# Patient Record
Sex: FEMALE | Race: WHITE | URBAN - METROPOLITAN AREA
[De-identification: names, ages, dates, MRNs, and addresses within clinical notes are randomized per-mention and may not be internally consistent; named-entity substitution may affect disease eponyms.]

---

## 2018-02-26 ENCOUNTER — OUTPATIENT (OUTPATIENT)
Dept: OUTPATIENT SERVICES | Facility: HOSPITAL | Age: 83
LOS: 1 days | Discharge: HOME | End: 2018-02-26

## 2018-02-26 DIAGNOSIS — Z13.228 ENCOUNTER FOR SCREENING FOR OTHER METABOLIC DISORDERS: ICD-10-CM

## 2018-02-26 DIAGNOSIS — I10 ESSENTIAL (PRIMARY) HYPERTENSION: ICD-10-CM

## 2018-02-26 DIAGNOSIS — Z13.21 ENCOUNTER FOR SCREENING FOR NUTRITIONAL DISORDER: ICD-10-CM

## 2018-02-26 DIAGNOSIS — N39.0 URINARY TRACT INFECTION, SITE NOT SPECIFIED: ICD-10-CM

## 2018-02-26 DIAGNOSIS — E55.9 VITAMIN D DEFICIENCY, UNSPECIFIED: ICD-10-CM

## 2018-02-26 DIAGNOSIS — Z13.9 ENCOUNTER FOR SCREENING, UNSPECIFIED: ICD-10-CM

## 2018-02-26 DIAGNOSIS — D75.9 DISEASE OF BLOOD AND BLOOD-FORMING ORGANS, UNSPECIFIED: ICD-10-CM

## 2018-02-26 DIAGNOSIS — Z13.220 ENCOUNTER FOR SCREENING FOR LIPOID DISORDERS: ICD-10-CM

## 2018-02-26 DIAGNOSIS — E78.5 HYPERLIPIDEMIA, UNSPECIFIED: ICD-10-CM

## 2018-02-28 ENCOUNTER — TRANSCRIPTION ENCOUNTER (OUTPATIENT)
Age: 83
End: 2018-02-28

## 2018-10-14 ENCOUNTER — EMERGENCY (EMERGENCY)
Facility: HOSPITAL | Age: 83
LOS: 0 days | Discharge: HOME | End: 2018-10-14
Attending: EMERGENCY MEDICINE

## 2018-10-14 ENCOUNTER — TRANSCRIPTION ENCOUNTER (OUTPATIENT)
Age: 83
End: 2018-10-14

## 2018-10-14 VITALS
DIASTOLIC BLOOD PRESSURE: 89 MMHG | SYSTOLIC BLOOD PRESSURE: 210 MMHG | TEMPERATURE: 98 F | OXYGEN SATURATION: 100 % | HEART RATE: 91 BPM | RESPIRATION RATE: 18 BRPM

## 2018-10-14 DIAGNOSIS — R07.89 OTHER CHEST PAIN: ICD-10-CM

## 2018-10-14 DIAGNOSIS — R06.02 SHORTNESS OF BREATH: ICD-10-CM

## 2018-10-14 DIAGNOSIS — J44.9 CHRONIC OBSTRUCTIVE PULMONARY DISEASE, UNSPECIFIED: ICD-10-CM

## 2018-10-14 LAB
ALBUMIN SERPL ELPH-MCNC: 4.5 G/DL — SIGNIFICANT CHANGE UP (ref 3.5–5.2)
ALP SERPL-CCNC: 71 U/L — SIGNIFICANT CHANGE UP (ref 30–115)
ALT FLD-CCNC: 18 U/L — SIGNIFICANT CHANGE UP (ref 0–41)
ANION GAP SERPL CALC-SCNC: 12 MMOL/L — SIGNIFICANT CHANGE UP (ref 7–14)
APPEARANCE UR: CLEAR — SIGNIFICANT CHANGE UP
APTT BLD: 29.4 SEC — SIGNIFICANT CHANGE UP (ref 27–39.2)
AST SERPL-CCNC: 21 U/L — SIGNIFICANT CHANGE UP (ref 0–41)
BASOPHILS # BLD AUTO: 0.05 K/UL — SIGNIFICANT CHANGE UP (ref 0–0.2)
BASOPHILS NFR BLD AUTO: 0.8 % — SIGNIFICANT CHANGE UP (ref 0–1)
BILIRUB SERPL-MCNC: 0.3 MG/DL — SIGNIFICANT CHANGE UP (ref 0.2–1.2)
BILIRUB UR-MCNC: NEGATIVE — SIGNIFICANT CHANGE UP
BUN SERPL-MCNC: 10 MG/DL — SIGNIFICANT CHANGE UP (ref 10–20)
CALCIUM SERPL-MCNC: 10.1 MG/DL — SIGNIFICANT CHANGE UP (ref 8.5–10.1)
CHLORIDE SERPL-SCNC: 96 MMOL/L — LOW (ref 98–110)
CO2 SERPL-SCNC: 28 MMOL/L — SIGNIFICANT CHANGE UP (ref 17–32)
COLOR SPEC: YELLOW — SIGNIFICANT CHANGE UP
CREAT SERPL-MCNC: 0.7 MG/DL — SIGNIFICANT CHANGE UP (ref 0.7–1.5)
D DIMER BLD IA.RAPID-MCNC: 202 NG/ML DDU — SIGNIFICANT CHANGE UP (ref 0–230)
DIFF PNL FLD: NEGATIVE — SIGNIFICANT CHANGE UP
EOSINOPHIL # BLD AUTO: 0.13 K/UL — SIGNIFICANT CHANGE UP (ref 0–0.7)
EOSINOPHIL NFR BLD AUTO: 2.2 % — SIGNIFICANT CHANGE UP (ref 0–8)
GLUCOSE SERPL-MCNC: 150 MG/DL — HIGH (ref 70–99)
GLUCOSE UR QL: NEGATIVE MG/DL — SIGNIFICANT CHANGE UP
HCT VFR BLD CALC: 39.6 % — SIGNIFICANT CHANGE UP (ref 37–47)
HGB BLD-MCNC: 13.8 G/DL — SIGNIFICANT CHANGE UP (ref 12–16)
IMM GRANULOCYTES NFR BLD AUTO: 0.3 % — SIGNIFICANT CHANGE UP (ref 0.1–0.3)
INR BLD: 1.02 RATIO — SIGNIFICANT CHANGE UP (ref 0.65–1.3)
KETONES UR-MCNC: NEGATIVE — SIGNIFICANT CHANGE UP
LEUKOCYTE ESTERASE UR-ACNC: NEGATIVE — SIGNIFICANT CHANGE UP
LYMPHOCYTES # BLD AUTO: 1.52 K/UL — SIGNIFICANT CHANGE UP (ref 1.2–3.4)
LYMPHOCYTES # BLD AUTO: 25.8 % — SIGNIFICANT CHANGE UP (ref 20.5–51.1)
MCHC RBC-ENTMCNC: 31.2 PG — HIGH (ref 27–31)
MCHC RBC-ENTMCNC: 34.8 G/DL — SIGNIFICANT CHANGE UP (ref 32–37)
MCV RBC AUTO: 89.6 FL — SIGNIFICANT CHANGE UP (ref 81–99)
MONOCYTES # BLD AUTO: 0.45 K/UL — SIGNIFICANT CHANGE UP (ref 0.1–0.6)
MONOCYTES NFR BLD AUTO: 7.6 % — SIGNIFICANT CHANGE UP (ref 1.7–9.3)
NEUTROPHILS # BLD AUTO: 3.73 K/UL — SIGNIFICANT CHANGE UP (ref 1.4–6.5)
NEUTROPHILS NFR BLD AUTO: 63.3 % — SIGNIFICANT CHANGE UP (ref 42.2–75.2)
NITRITE UR-MCNC: NEGATIVE — SIGNIFICANT CHANGE UP
NRBC # BLD: 0 /100 WBCS — SIGNIFICANT CHANGE UP (ref 0–0)
NT-PROBNP SERPL-SCNC: 153 PG/ML — SIGNIFICANT CHANGE UP (ref 0–300)
PH UR: 6.5 — SIGNIFICANT CHANGE UP (ref 5–8)
PLATELET # BLD AUTO: 344 K/UL — SIGNIFICANT CHANGE UP (ref 130–400)
POTASSIUM SERPL-MCNC: 4.2 MMOL/L — SIGNIFICANT CHANGE UP (ref 3.5–5)
POTASSIUM SERPL-SCNC: 4.2 MMOL/L — SIGNIFICANT CHANGE UP (ref 3.5–5)
PROT SERPL-MCNC: 7.3 G/DL — SIGNIFICANT CHANGE UP (ref 6–8)
PROT UR-MCNC: NEGATIVE MG/DL — SIGNIFICANT CHANGE UP
PROTHROM AB SERPL-ACNC: 11.7 SEC — SIGNIFICANT CHANGE UP (ref 9.95–12.87)
RBC # BLD: 4.42 M/UL — SIGNIFICANT CHANGE UP (ref 4.2–5.4)
RBC # FLD: 13.1 % — SIGNIFICANT CHANGE UP (ref 11.5–14.5)
SODIUM SERPL-SCNC: 136 MMOL/L — SIGNIFICANT CHANGE UP (ref 135–146)
SP GR SPEC: 1.01 — SIGNIFICANT CHANGE UP (ref 1.01–1.03)
TROPONIN T SERPL-MCNC: <0.01 NG/ML — SIGNIFICANT CHANGE UP
UROBILINOGEN FLD QL: 0.2 MG/DL — SIGNIFICANT CHANGE UP (ref 0.2–0.2)
WBC # BLD: 5.9 K/UL — SIGNIFICANT CHANGE UP (ref 4.8–10.8)
WBC # FLD AUTO: 5.9 K/UL — SIGNIFICANT CHANGE UP (ref 4.8–10.8)

## 2018-10-14 NOTE — ED PROVIDER NOTE - PROGRESS NOTE DETAILS
all results d/w pt, advised of recommendation to stay in Obs for ACS work-up - pt does not want to stay, wishes to leave AMA and f/u with her PMD Dr. Way - risks of same explained to pt incl poss disability/death - pt verbalized understanding of same, encouraged to return to ED @ any time for further mgmt

## 2018-10-14 NOTE — ED PROVIDER NOTE - PHYSICAL EXAMINATION
VITAL SIGNS: I have reviewed nursing notes and confirm.  CONSTITUTIONAL: elderly f, wdwn, nad  SKIN: Skin exam is warm and dry, no acute rash.  HEAD: Normocephalic; atraumatic.  EYES: PERRL, EOM intact; conjunctiva and sclera clear.  ENT: No nasal discharge; airway clear.  NECK: Supple; non tender.  CARD: S1, S2 normal; no murmurs, gallops, or rubs. Regular rate and rhythm.  RESP: CTAB. No wheezes, rales or rhonchi.  ABD: Normal bowel sounds; soft; non-distended; non-tender; no hepatosplenomegaly.  EXT: Normal ROM. No clubbing, cyanosis or edema. DPI. No calf erythema or ttp.  BACK: non-tender, no CVAT  NEURO: Alert, oriented. Grossly unremarkable. No focal deficits.  PSYCH: Cooperative, appropriate.

## 2018-10-14 NOTE — ED PROVIDER NOTE - OBJECTIVE STATEMENT
83y f h/o copd no home O2 p/w incr sob x 5d. Worse w/exertion/exercising, accomp by chest tightness yesterday while doing strenuous yard work. Denies dizziness, palpitations, diaphoresis, nv, abd pain, flank pain, calf pain/erythema/edema. +Recent plane travel, flew back from hospitals appx 1 week ago. Rpts a negative stress test in past, but has never seen a Cardiologist. PMD Dr. Rayna Rodríguez.

## 2018-10-14 NOTE — ED ADULT NURSE NOTE - NSIMPLEMENTINTERV_GEN_ALL_ED
Implemented All Universal Safety Interventions:  [place] to call system. Call bell, personal items and telephone within reach. Instruct patient to call for assistance. Room bathroom lighting operational. Non-slip footwear when patient is off stretcher. Physically safe environment: no spills, clutter or unnecessary equipment. Stretcher in lowest position, wheels locked, appropriate side rails in place.

## 2018-10-14 NOTE — ED PROVIDER NOTE - NS ED ROS FT
Constitutional: No fever, chills, unintended weight loss.  Eyes:  No visual changes, eye pain or discharge.  ENMT:  No hearing changes, pain, no sore throat or runny nose, no difficulty swallowing  Cardiac:  see hpi  Respiratory:  No cough or respiratory distress. No hemoptysis. +copd.  GI:  No nausea, vomiting, diarrhea or abdominal pain.  :  No dysuria, frequency or burning.  MS:  No myalgia, muscle weakness, joint pain or back pain.  Neuro:  No headache or weakness.  No LOC.  Skin:  No skin rash.   Endocrine: No history of thyroid disease or diabetes.

## 2018-10-14 NOTE — ED PROVIDER NOTE - MEDICAL DECISION MAKING DETAILS
sob, chest tightness, recent travel - no wheezing on exam - ddx incl ACS vs. PE - ekg, cxr, labs incl d-dimer, reassess

## 2018-10-15 LAB
CULTURE RESULTS: SIGNIFICANT CHANGE UP
SPECIMEN SOURCE: SIGNIFICANT CHANGE UP

## 2019-03-14 ENCOUNTER — OUTPATIENT (OUTPATIENT)
Dept: OUTPATIENT SERVICES | Facility: HOSPITAL | Age: 84
LOS: 1 days | Discharge: HOME | End: 2019-03-14

## 2019-03-14 DIAGNOSIS — D53.9 NUTRITIONAL ANEMIA, UNSPECIFIED: ICD-10-CM

## 2019-03-14 PROBLEM — J44.9 CHRONIC OBSTRUCTIVE PULMONARY DISEASE, UNSPECIFIED: Chronic | Status: ACTIVE | Noted: 2018-10-14

## 2019-03-18 ENCOUNTER — OUTPATIENT (OUTPATIENT)
Dept: OUTPATIENT SERVICES | Facility: HOSPITAL | Age: 84
LOS: 1 days | Discharge: HOME | End: 2019-03-18

## 2019-03-18 DIAGNOSIS — Z00.00 ENCOUNTER FOR GENERAL ADULT MEDICAL EXAMINATION WITHOUT ABNORMAL FINDINGS: ICD-10-CM

## 2019-12-16 ENCOUNTER — TRANSCRIPTION ENCOUNTER (OUTPATIENT)
Age: 84
End: 2019-12-16

## 2020-01-04 ENCOUNTER — TRANSCRIPTION ENCOUNTER (OUTPATIENT)
Age: 85
End: 2020-01-04

## 2020-01-21 ENCOUNTER — INPATIENT (INPATIENT)
Facility: HOSPITAL | Age: 85
LOS: 1 days | Discharge: HOME | End: 2020-01-23
Attending: INTERNAL MEDICINE | Admitting: INTERNAL MEDICINE
Payer: MEDICARE

## 2020-01-21 VITALS
TEMPERATURE: 96 F | DIASTOLIC BLOOD PRESSURE: 65 MMHG | OXYGEN SATURATION: 90 % | HEART RATE: 96 BPM | SYSTOLIC BLOOD PRESSURE: 149 MMHG | RESPIRATION RATE: 22 BRPM

## 2020-01-21 LAB
ALBUMIN SERPL ELPH-MCNC: 4.3 G/DL — SIGNIFICANT CHANGE UP (ref 3.5–5.2)
ALP SERPL-CCNC: 72 U/L — SIGNIFICANT CHANGE UP (ref 30–115)
ALT FLD-CCNC: 18 U/L — SIGNIFICANT CHANGE UP (ref 0–41)
ANION GAP SERPL CALC-SCNC: 13 MMOL/L — SIGNIFICANT CHANGE UP (ref 7–14)
AST SERPL-CCNC: 24 U/L — SIGNIFICANT CHANGE UP (ref 0–41)
BASE EXCESS BLDV CALC-SCNC: 3.2 MMOL/L — HIGH (ref -2–2)
BASOPHILS # BLD AUTO: 0.04 K/UL — SIGNIFICANT CHANGE UP (ref 0–0.2)
BASOPHILS NFR BLD AUTO: 0.4 % — SIGNIFICANT CHANGE UP (ref 0–1)
BILIRUB SERPL-MCNC: 0.3 MG/DL — SIGNIFICANT CHANGE UP (ref 0.2–1.2)
BUN SERPL-MCNC: 11 MG/DL — SIGNIFICANT CHANGE UP (ref 10–20)
CA-I SERPL-SCNC: 1.16 MMOL/L — SIGNIFICANT CHANGE UP (ref 1.12–1.3)
CALCIUM SERPL-MCNC: 9.3 MG/DL — SIGNIFICANT CHANGE UP (ref 8.5–10.1)
CHLORIDE SERPL-SCNC: 99 MMOL/L — SIGNIFICANT CHANGE UP (ref 98–110)
CO2 SERPL-SCNC: 24 MMOL/L — SIGNIFICANT CHANGE UP (ref 17–32)
CREAT SERPL-MCNC: 0.7 MG/DL — SIGNIFICANT CHANGE UP (ref 0.7–1.5)
D DIMER BLD IA.RAPID-MCNC: 277 NG/ML DDU — HIGH (ref 0–230)
EOSINOPHIL # BLD AUTO: 0 K/UL — SIGNIFICANT CHANGE UP (ref 0–0.7)
EOSINOPHIL NFR BLD AUTO: 0 % — SIGNIFICANT CHANGE UP (ref 0–8)
FLU A RESULT: POSITIVE
FLU A RESULT: POSITIVE
FLUAV AG NPH QL: POSITIVE
FLUBV AG NPH QL: NEGATIVE — SIGNIFICANT CHANGE UP
GAS PNL BLDV: 134 MMOL/L — LOW (ref 136–145)
GAS PNL BLDV: SIGNIFICANT CHANGE UP
GAS PNL BLDV: SIGNIFICANT CHANGE UP
GLUCOSE SERPL-MCNC: 165 MG/DL — HIGH (ref 70–99)
HCO3 BLDV-SCNC: 28 MMOL/L — SIGNIFICANT CHANGE UP (ref 22–29)
HCT VFR BLD CALC: 37.1 % — SIGNIFICANT CHANGE UP (ref 37–47)
HCT VFR BLDA CALC: 41.4 % — SIGNIFICANT CHANGE UP (ref 34–44)
HGB BLD CALC-MCNC: 13.5 G/DL — LOW (ref 14–18)
HGB BLD-MCNC: 13 G/DL — SIGNIFICANT CHANGE UP (ref 12–16)
IMM GRANULOCYTES NFR BLD AUTO: 0.4 % — HIGH (ref 0.1–0.3)
LACTATE BLDV-MCNC: 1 MMOL/L — SIGNIFICANT CHANGE UP (ref 0.5–1.6)
LACTATE SERPL-SCNC: 1.1 MMOL/L — SIGNIFICANT CHANGE UP (ref 0.7–2)
LIDOCAIN IGE QN: 16 U/L — SIGNIFICANT CHANGE UP (ref 7–60)
LYMPHOCYTES # BLD AUTO: 0.44 K/UL — LOW (ref 1.2–3.4)
LYMPHOCYTES # BLD AUTO: 4.6 % — LOW (ref 20.5–51.1)
MCHC RBC-ENTMCNC: 32.3 PG — HIGH (ref 27–31)
MCHC RBC-ENTMCNC: 35 G/DL — SIGNIFICANT CHANGE UP (ref 32–37)
MCV RBC AUTO: 92.1 FL — SIGNIFICANT CHANGE UP (ref 81–99)
MONOCYTES # BLD AUTO: 0.5 K/UL — SIGNIFICANT CHANGE UP (ref 0.1–0.6)
MONOCYTES NFR BLD AUTO: 5.2 % — SIGNIFICANT CHANGE UP (ref 1.7–9.3)
NEUTROPHILS # BLD AUTO: 8.53 K/UL — HIGH (ref 1.4–6.5)
NEUTROPHILS NFR BLD AUTO: 89.4 % — HIGH (ref 42.2–75.2)
NRBC # BLD: 0 /100 WBCS — SIGNIFICANT CHANGE UP (ref 0–0)
PCO2 BLDV: 41 MMHG — SIGNIFICANT CHANGE UP (ref 41–51)
PH BLDV: 7.44 — HIGH (ref 7.26–7.43)
PLATELET # BLD AUTO: 295 K/UL — SIGNIFICANT CHANGE UP (ref 130–400)
PO2 BLDV: 37 MMHG — SIGNIFICANT CHANGE UP (ref 20–40)
POTASSIUM BLDV-SCNC: 3.6 MMOL/L — SIGNIFICANT CHANGE UP (ref 3.3–5.6)
POTASSIUM SERPL-MCNC: 4 MMOL/L — SIGNIFICANT CHANGE UP (ref 3.5–5)
POTASSIUM SERPL-SCNC: 4 MMOL/L — SIGNIFICANT CHANGE UP (ref 3.5–5)
PROT SERPL-MCNC: 6.9 G/DL — SIGNIFICANT CHANGE UP (ref 6–8)
RBC # BLD: 4.03 M/UL — LOW (ref 4.2–5.4)
RBC # FLD: 13.6 % — SIGNIFICANT CHANGE UP (ref 11.5–14.5)
RSV RESULT: NEGATIVE — SIGNIFICANT CHANGE UP
RSV RNA RESP QL NAA+PROBE: NEGATIVE — SIGNIFICANT CHANGE UP
SAO2 % BLDV: 71 % — SIGNIFICANT CHANGE UP
SODIUM SERPL-SCNC: 136 MMOL/L — SIGNIFICANT CHANGE UP (ref 135–146)
TROPONIN T SERPL-MCNC: <0.01 NG/ML — SIGNIFICANT CHANGE UP
WBC # BLD: 9.55 K/UL — SIGNIFICANT CHANGE UP (ref 4.8–10.8)
WBC # FLD AUTO: 9.55 K/UL — SIGNIFICANT CHANGE UP (ref 4.8–10.8)

## 2020-01-21 PROCEDURE — 99285 EMERGENCY DEPT VISIT HI MDM: CPT | Mod: GC

## 2020-01-21 PROCEDURE — 71045 X-RAY EXAM CHEST 1 VIEW: CPT | Mod: 26

## 2020-01-21 PROCEDURE — 93010 ELECTROCARDIOGRAM REPORT: CPT

## 2020-01-21 PROCEDURE — 71275 CT ANGIOGRAPHY CHEST: CPT | Mod: 26

## 2020-01-21 RX ORDER — ACETAMINOPHEN 500 MG
650 TABLET ORAL ONCE
Refills: 0 | Status: COMPLETED | OUTPATIENT
Start: 2020-01-21 | End: 2020-01-21

## 2020-01-21 RX ORDER — IPRATROPIUM/ALBUTEROL SULFATE 18-103MCG
3 AEROSOL WITH ADAPTER (GRAM) INHALATION EVERY 6 HOURS
Refills: 0 | Status: DISCONTINUED | OUTPATIENT
Start: 2020-01-21 | End: 2020-01-23

## 2020-01-21 RX ORDER — SODIUM CHLORIDE 9 MG/ML
1000 INJECTION INTRAMUSCULAR; INTRAVENOUS; SUBCUTANEOUS ONCE
Refills: 0 | Status: COMPLETED | OUTPATIENT
Start: 2020-01-21 | End: 2020-01-21

## 2020-01-21 RX ORDER — CHLORHEXIDINE GLUCONATE 213 G/1000ML
1 SOLUTION TOPICAL
Refills: 0 | Status: DISCONTINUED | OUTPATIENT
Start: 2020-01-21 | End: 2020-01-23

## 2020-01-21 RX ORDER — IPRATROPIUM/ALBUTEROL SULFATE 18-103MCG
3 AEROSOL WITH ADAPTER (GRAM) INHALATION EVERY 6 HOURS
Refills: 0 | Status: DISCONTINUED | OUTPATIENT
Start: 2020-01-21 | End: 2020-01-21

## 2020-01-21 RX ORDER — GUAIFENESIN/DEXTROMETHORPHAN 600MG-30MG
10 TABLET, EXTENDED RELEASE 12 HR ORAL EVERY 4 HOURS
Refills: 0 | Status: DISCONTINUED | OUTPATIENT
Start: 2020-01-21 | End: 2020-01-23

## 2020-01-21 RX ORDER — ONDANSETRON 8 MG/1
4 TABLET, FILM COATED ORAL EVERY 6 HOURS
Refills: 0 | Status: DISCONTINUED | OUTPATIENT
Start: 2020-01-21 | End: 2020-01-23

## 2020-01-21 RX ORDER — BUDESONIDE AND FORMOTEROL FUMARATE DIHYDRATE 160; 4.5 UG/1; UG/1
2 AEROSOL RESPIRATORY (INHALATION)
Refills: 0 | Status: DISCONTINUED | OUTPATIENT
Start: 2020-01-21 | End: 2020-01-23

## 2020-01-21 RX ORDER — ENOXAPARIN SODIUM 100 MG/ML
40 INJECTION SUBCUTANEOUS DAILY
Refills: 0 | Status: DISCONTINUED | OUTPATIENT
Start: 2020-01-21 | End: 2020-01-23

## 2020-01-21 RX ORDER — SODIUM CHLORIDE 9 MG/ML
1000 INJECTION INTRAMUSCULAR; INTRAVENOUS; SUBCUTANEOUS
Refills: 0 | Status: DISCONTINUED | OUTPATIENT
Start: 2020-01-21 | End: 2020-01-22

## 2020-01-21 RX ORDER — ONDANSETRON 8 MG/1
4 TABLET, FILM COATED ORAL ONCE
Refills: 0 | Status: COMPLETED | OUTPATIENT
Start: 2020-01-21 | End: 2020-01-21

## 2020-01-21 RX ORDER — ACETAMINOPHEN 500 MG
650 TABLET ORAL EVERY 6 HOURS
Refills: 0 | Status: DISCONTINUED | OUTPATIENT
Start: 2020-01-21 | End: 2020-01-23

## 2020-01-21 RX ORDER — ALBUTEROL 90 UG/1
2 AEROSOL, METERED ORAL EVERY 4 HOURS
Refills: 0 | Status: DISCONTINUED | OUTPATIENT
Start: 2020-01-21 | End: 2020-01-23

## 2020-01-21 RX ADMIN — CHLORHEXIDINE GLUCONATE 1 APPLICATION(S): 213 SOLUTION TOPICAL at 17:05

## 2020-01-21 RX ADMIN — ONDANSETRON 4 MILLIGRAM(S): 8 TABLET, FILM COATED ORAL at 08:03

## 2020-01-21 RX ADMIN — Medication 650 MILLIGRAM(S): at 09:28

## 2020-01-21 RX ADMIN — Medication 3 MILLILITER(S): at 15:29

## 2020-01-21 RX ADMIN — Medication 650 MILLIGRAM(S): at 19:51

## 2020-01-21 RX ADMIN — Medication 10 MILLILITER(S): at 22:47

## 2020-01-21 RX ADMIN — Medication 75 MILLIGRAM(S): at 14:37

## 2020-01-21 RX ADMIN — Medication 10 MILLILITER(S): at 17:28

## 2020-01-21 RX ADMIN — Medication 3 MILLILITER(S): at 21:49

## 2020-01-21 RX ADMIN — SODIUM CHLORIDE 2000 MILLILITER(S): 9 INJECTION INTRAMUSCULAR; INTRAVENOUS; SUBCUTANEOUS at 08:03

## 2020-01-21 RX ADMIN — SODIUM CHLORIDE 50 MILLILITER(S): 9 INJECTION INTRAMUSCULAR; INTRAVENOUS; SUBCUTANEOUS at 15:29

## 2020-01-21 RX ADMIN — ENOXAPARIN SODIUM 40 MILLIGRAM(S): 100 INJECTION SUBCUTANEOUS at 14:37

## 2020-01-21 NOTE — ED PROVIDER NOTE - ATTENDING CONTRIBUTION TO CARE
83 yo female with PMH HTN, COPD presented c/o nausea and vomiting associated with tactile fevers and inability to tolerate PO. + nonbloody diarrhea, dry cough and SOB. Denied any CP or palpitations. No abdominal pain, urinary complaints or flank pain. Sx started after return from [place] 5 days earlier. NO headaches, rashes, edema or joint pain.    VITAL SIGNS: noted  CONSTITUTIONAL: Well-developed; well-nourished; in no acute distress  HEAD: Normocephalic; atraumatic  EYES: PERRL, EOM intact; conjunctiva and sclera clear  ENT: No nasal discharge; airway clear. MMM  NECK: Supple; non tender. No anterior cervical lymphadenopathy noted  CARD: S1, S2 normal; no murmurs, gallops, or rubs. Regular rate and rhythm  RESP: CTAB/L, no wheezes, rales or rhonchi  ABD: Normal bowel sounds; soft; non-distended; non-tender; no hepatosplenomegaly. No CVA tenderness  EXT: Normal ROM. No calf tenderness or edema. Distal pulses intact  NEURO: Alert, oriented. Grossly unremarkable. No focal deficits  SKIN: Skin exam is warm and dry, no acute rash  MS: No midline spinal tenderness

## 2020-01-21 NOTE — ED PROVIDER NOTE - NS ED ROS FT
Constitutional: (+) fever (+) vomiting  Eyes/ENT: (-) vision changes, (-) hearing changes  Cardiovascular: (-) chest pain, (+) sob  Respiratory: (-) cough, (+) shortness of breath  Gastrointestinal: (+) vomiting, (-) diarrhea, (-) abdominal pain  : (-) dysuria   Musculoskeletal: (-) back pain  Integumentary: (-) rash, (-) edema  Neurological: (-)loc  Allergic/Immunologic: (-) pruritus  Endocrine: No history of thyroid disease or diabetes.

## 2020-01-21 NOTE — ED ADULT TRIAGE NOTE - CHIEF COMPLAINT QUOTE
C/o SOB, fever, n/v/d since yesterday afternoon. Pt returned from [place] on Thursday. Mask placed on pt.

## 2020-01-21 NOTE — H&P ADULT - ATTENDING COMMENTS
pt seen and examined independently, I have read and agree with above exam and poa    sob/cough  recent return from [place]  infleunza      see progress notes  d/w ho

## 2020-01-21 NOTE — ED PROVIDER NOTE - CARE PLAN
Principal Discharge DX:	SOB (shortness of breath)  Secondary Diagnosis:	Nausea and vomiting  Secondary Diagnosis:	Fever

## 2020-01-21 NOTE — H&P ADULT - NSHPLABSRESULTS_GEN_ALL_CORE
LABS:                        13.0   9.55  )-----------( 295      ( 21 Jan 2020 07:36 )             37.1     01-21    136  |  99  |  11  ----------------------------<  165<H>  4.0   |  24  |  0.7    Ca    9.3      21 Jan 2020 07:36    TPro  6.9  /  Alb  4.3  /  TBili  0.3  /  DBili  x   /  AST  24  /  ALT  18  /  AlkPhos  72  01-21          Troponin T, Serum: <0.01 ng/mL (01-21-20 @ 09:28)  Lactate, Blood: 1.1 mmol/L (01-21-20 @ 07:36)      CARDIAC MARKERS ( 21 Jan 2020 09:28 )  x     / <0.01 ng/mL / x     / x     / x          RADIOLOGY:    EXAM:  CT ANGIO CHEST (W)AW IC            PROCEDURE DATE:  01/21/2020            INTERPRETATION:  CLINICAL STATEMENT: Shortness of breath. Evaluation for pulmonary embolism.    TECHNIQUE: Multislice helical sections were obtained from the thoracic inlet to the lung bases during rapid administration of 65 mL Optiray 320 intravenous contrast using a CTA protocol. Thin sections were reconstructed through the pulmonary vasculature. Coronal and sagittal reformatted images are also submitted.    COMPARISON CT: None.     OTHER STUDIES USED FOR CORRELATION: Chest radiograph dated 1/21/2020      FINDINGS:    PULMONARY EMBOLUS: No central or segmental pulmonary embolus.    LUNGS, PLEURA, AIRWAYS: Patent central tracheobronchial tree. No evidence of pleural effusion, pneumothorax or consolidation. Emphysema.     THORACIC NODES: No mediastinal, hilar or axillary lymphadenopathy.    MEDIASTINUM/GREAT VESSELS: Nodular thyroid gland with a large nodule/cluster of centrally cystic nodules measuring up to 6.2 cm in the left lobe. Resultant deviation of the trachea to the right. Normal heart size. No pericardial effusion. Normal caliber aorta. Coronary artery and thoracic aortic calcifications.    VISUALIZED UPPER ABDOMEN: Small hiatal hernia.    BONES/SOFT TISSUES: Mild degenerative changes of the spine.      IMPRESSION:     1.  No central or segmental pulmonary embolus.    2.  Nodular thyroid gland with a large nodule/cluster of centrally cystic nodules measuring up to 6.2 cm in the left lobe. Recommend nonemergent outpatient thyroid ultrasound for further evaluation.

## 2020-01-21 NOTE — ED ADULT NURSE NOTE - NSIMPLEMENTINTERV_GEN_ALL_ED
Implemented All Universal Safety Interventions:  [place] to call system. Call bell, personal items and telephone within reach. Instruct patient to call for assistance. Room bathroom lighting operational. Non-slip footwear when patient is off stretcher. Physically safe environment: no spills, clutter or unnecessary equipment. Stretcher in lowest position, wheels locked, appropriate side rails in place.

## 2020-01-21 NOTE — ED PROVIDER NOTE - OBJECTIVE STATEMENT
85 yo F pmhx asthma, copd, current smoker, returned from [place] in [place] 5 days ago presents CC 5 days of nausea, vomiting, inability to tolerate po, diarrhea, sob. no chest pain, no le edema, no abd pain.

## 2020-01-21 NOTE — H&P ADULT - NSHPPHYSICALEXAM_GEN_ALL_CORE
VITALS:   T(F): 100.1  HR: 104  BP: 102/68  RR: 22  SpO2: 96%    PHYSICAL EXAM:  GEN: No acute distress, coughing frequently. has nasal cannula.  LUNGS: mild wheezing on R side  HEART: S1/S2 present. RRR.   ABD: Soft, non-tender, non-distended. Bowel sounds present  EXT: NC/NC/NE/FULLER  NEURO: AAOX3

## 2020-01-21 NOTE — H&P ADULT - HISTORY OF PRESENT ILLNESS
84 F w/ PMH of Asthma/COPD not on home O2, denies any other significant PMH, presenting to the ER because of SOB. The pt says for the past 1-2 days she has been having increasing dyspnea, as well as productive cough w/ clear-pinkish sputum, also, diarrhea, 6 episodes yesterday, non-bloody, described as somewhat watery, nausea and vomiting. Pt did return from [place] on Thursday, denies being sick during the trip, says she did have some diarrhea before the trip, that resolved and came back yesterday.    In the ED, pt was pox of 90% on RA, placed on 2L O2 -> 96% pox, had a T: 102F, and , BP: 102/68.  A CT angio was neg for PE, or consolidation.  Given 1L IVF, Tylenol, and Zofran. Admitted for further workup.

## 2020-01-21 NOTE — ED PROVIDER NOTE - PHYSICAL EXAMINATION
CONSTITUTIONAL: Well-developed; well-nourished; in no acute distress, speaking in full sentences  SKIN: warm, dry  HEAD: Normocephalic; atraumatic  EYES: PERRL, EOMI, no conjunctival erythema  ENT: No nasal discharge; airway clear, mucous membranes moist  NECK: Supple; non tender, FROM  CARD: +S1, S2 no murmurs, gallops, or rubs. Regular rate and rhythm. radial 2+  RESP: No wheezes, rales or rhonchi. CTABL  ABD: soft ntnd, no rebound, no guarding, no rigidity, neg murphys  EXT: moves all extremities, no calf tenderness, No clubbing, cyanosis or edema.   NEURO: Alert, oriented, grossly unremarkable, no focal deficits, cn ii-xii grossly intact  PSYCH: Cooperative, appropriate

## 2020-01-21 NOTE — ED PROVIDER NOTE - CLINICAL SUMMARY MEDICAL DECISION MAKING FREE TEXT BOX
pt seen for V/D and SOB, labs unremarkable, troponin negative and dimer elevated. CTA performed as pt recently returned from [place] and hypoxic to 90%. CTA negative for PE, pt febrile and admitted for hypoxemia and further workup and treatment.

## 2020-01-21 NOTE — H&P ADULT - ASSESSMENT
84 F w/ Asthma/COPD presenting with SOB & Diarrhea, found to have a fever 102F, 90% on RA, , CXR and CT neg.    # SIRS 2/2 URI complicated by Asthma/COPD Exacerbation  - Wean off oxygen  - RVP and rapid FLU sent, isolation until results  - No evidence of PNA on imaging, holding abx  - Duonebs q6hrs + Albuterol prn + Symbicort  - Tylenol prn / Mucinex  - Consider adding steroids if no improvement and infection ruled out    # Nausea / Vomiting / Diarrhea. Sxs resolved, likely viral gastroenteritis.   - IVF for now until tolerating PO  - Full Liquid diet advance as tolerated  - If continues, stool studies ordered, add C.diff if watery stools although pt has no recent exposure to   - Consider CT A/P if no improvement    # CHG  # DVTppx  # Full code  # Dispo: Home 84 F w/ Asthma/COPD presenting with SOB & Diarrhea, found to have a fever 102F, 90% on RA, , CXR and CT neg.    # SIRS 2/2 URI complicated by Asthma/COPD Exacerbation  - Wean off oxygen  - RVP and rapid FLU sent, isolation until results  - No evidence of PNA on imaging, holding abx  - Duonebs q6hrs + Albuterol prn + Symbicort  - Tylenol prn / Mucinex  - Consider adding steroids if no improvement and infection ruled out    # Nausea / Vomiting / Diarrhea. Sxs resolved, likely viral gastroenteritis.   - IVF for now until tolerating PO  - Full Liquid diet advance as tolerated  - If continues, stool studies ordered, add C.diff if watery stools although pt has no recent exposure to   - Consider CT A/P if no improvement    # thyroid nodule - o/p ultrasound  # CHG  # DVTppx  # Full code  # Dispo: Home

## 2020-01-21 NOTE — H&P ADULT - NSHPSOCIALHISTORY_GEN_ALL_CORE
Lives at home, alone  Fully functional  Denies smoking cigarettes since 1980  Denies alcohol or drugs

## 2020-01-22 LAB
ALBUMIN SERPL ELPH-MCNC: 3.5 G/DL — SIGNIFICANT CHANGE UP (ref 3.5–5.2)
ALP SERPL-CCNC: 54 U/L — SIGNIFICANT CHANGE UP (ref 30–115)
ALT FLD-CCNC: 27 U/L — SIGNIFICANT CHANGE UP (ref 0–41)
ANION GAP SERPL CALC-SCNC: 10 MMOL/L — SIGNIFICANT CHANGE UP (ref 7–14)
AST SERPL-CCNC: 35 U/L — SIGNIFICANT CHANGE UP (ref 0–41)
BASOPHILS # BLD AUTO: 0.03 K/UL — SIGNIFICANT CHANGE UP (ref 0–0.2)
BASOPHILS NFR BLD AUTO: 0.7 % — SIGNIFICANT CHANGE UP (ref 0–1)
BILIRUB SERPL-MCNC: <0.2 MG/DL — SIGNIFICANT CHANGE UP (ref 0.2–1.2)
BUN SERPL-MCNC: 7 MG/DL — LOW (ref 10–20)
CALCIUM SERPL-MCNC: 8.5 MG/DL — SIGNIFICANT CHANGE UP (ref 8.5–10.1)
CHLORIDE SERPL-SCNC: 101 MMOL/L — SIGNIFICANT CHANGE UP (ref 98–110)
CO2 SERPL-SCNC: 23 MMOL/L — SIGNIFICANT CHANGE UP (ref 17–32)
CREAT SERPL-MCNC: 0.6 MG/DL — LOW (ref 0.7–1.5)
EOSINOPHIL # BLD AUTO: 0.01 K/UL — SIGNIFICANT CHANGE UP (ref 0–0.7)
EOSINOPHIL NFR BLD AUTO: 0.2 % — SIGNIFICANT CHANGE UP (ref 0–8)
FLUAV H3 RNA SPEC QL NAA+PROBE: DETECTED
GLUCOSE BLDC GLUCOMTR-MCNC: 114 MG/DL — HIGH (ref 70–99)
GLUCOSE SERPL-MCNC: 111 MG/DL — HIGH (ref 70–99)
HCT VFR BLD CALC: 32.5 % — LOW (ref 37–47)
HGB BLD-MCNC: 11.2 G/DL — LOW (ref 12–16)
IMM GRANULOCYTES NFR BLD AUTO: 0.2 % — SIGNIFICANT CHANGE UP (ref 0.1–0.3)
LYMPHOCYTES # BLD AUTO: 0.86 K/UL — LOW (ref 1.2–3.4)
LYMPHOCYTES # BLD AUTO: 19.7 % — LOW (ref 20.5–51.1)
MAGNESIUM SERPL-MCNC: 1.8 MG/DL — SIGNIFICANT CHANGE UP (ref 1.8–2.4)
MCHC RBC-ENTMCNC: 31.8 PG — HIGH (ref 27–31)
MCHC RBC-ENTMCNC: 34.5 G/DL — SIGNIFICANT CHANGE UP (ref 32–37)
MCV RBC AUTO: 92.3 FL — SIGNIFICANT CHANGE UP (ref 81–99)
MONOCYTES # BLD AUTO: 0.59 K/UL — SIGNIFICANT CHANGE UP (ref 0.1–0.6)
MONOCYTES NFR BLD AUTO: 13.5 % — HIGH (ref 1.7–9.3)
NEUTROPHILS # BLD AUTO: 2.86 K/UL — SIGNIFICANT CHANGE UP (ref 1.4–6.5)
NEUTROPHILS NFR BLD AUTO: 65.7 % — SIGNIFICANT CHANGE UP (ref 42.2–75.2)
NRBC # BLD: 0 /100 WBCS — SIGNIFICANT CHANGE UP (ref 0–0)
PHOSPHATE SERPL-MCNC: 2.8 MG/DL — SIGNIFICANT CHANGE UP (ref 2.1–4.9)
PLATELET # BLD AUTO: 225 K/UL — SIGNIFICANT CHANGE UP (ref 130–400)
POTASSIUM SERPL-MCNC: 3.9 MMOL/L — SIGNIFICANT CHANGE UP (ref 3.5–5)
POTASSIUM SERPL-SCNC: 3.9 MMOL/L — SIGNIFICANT CHANGE UP (ref 3.5–5)
PROT SERPL-MCNC: 5.7 G/DL — LOW (ref 6–8)
RAPID RVP RESULT: DETECTED
RBC # BLD: 3.52 M/UL — LOW (ref 4.2–5.4)
RBC # FLD: 14 % — SIGNIFICANT CHANGE UP (ref 11.5–14.5)
SODIUM SERPL-SCNC: 134 MMOL/L — LOW (ref 135–146)
WBC # BLD: 4.36 K/UL — LOW (ref 4.8–10.8)
WBC # FLD AUTO: 4.36 K/UL — LOW (ref 4.8–10.8)

## 2020-01-22 RX ADMIN — Medication 3 MILLILITER(S): at 09:01

## 2020-01-22 RX ADMIN — Medication 3 MILLILITER(S): at 20:51

## 2020-01-22 RX ADMIN — Medication 75 MILLIGRAM(S): at 18:04

## 2020-01-22 RX ADMIN — Medication 10 MILLILITER(S): at 11:46

## 2020-01-22 RX ADMIN — Medication 10 MILLILITER(S): at 18:05

## 2020-01-22 RX ADMIN — Medication 3 MILLILITER(S): at 14:40

## 2020-01-22 RX ADMIN — Medication 75 MILLIGRAM(S): at 06:38

## 2020-01-22 RX ADMIN — Medication 10 MILLILITER(S): at 13:17

## 2020-01-22 RX ADMIN — Medication 10 MILLILITER(S): at 06:38

## 2020-01-22 RX ADMIN — Medication 10 MILLILITER(S): at 22:10

## 2020-01-22 NOTE — PROGRESS NOTE ADULT - SUBJECTIVE AND OBJECTIVE BOX
YOLY SCOTT 84y Female  MRN#: [id number]   CODE STATUS: FULL       SUBJECTIVE  Patient is a 84y old Female who presents with a chief complaint of SOB  Fever 102F (21 Jan 2020 13:09)  Currently admitted to medicine with the primary diagnosis of SOB (shortness of breath)    Today is hospital day 1d, and this morning she is     OBJECTIVE  PAST MEDICAL & SURGICAL HISTORY  COPD (chronic obstructive pulmonary disease)    ALLERGIES:  Allergy Status Unknown    MEDICATIONS:  STANDING MEDICATIONS  albuterol/ipratropium for Nebulization 3 milliLiter(s) Nebulizer every 6 hours  budesonide 160 MICROgram(s)/formoterol 4.5 MICROgram(s) Inhaler 2 Puff(s) Inhalation two times a day  chlorhexidine 4% Liquid 1 Application(s) Topical <User Schedule>  enoxaparin Injectable 40 milliGRAM(s) SubCutaneous daily  guaifenesin/dextromethorphan  Syrup 10 milliLiter(s) Oral every 4 hours  oseltamivir 75 milliGRAM(s) Oral two times a day  sodium chloride 0.9%. 1000 milliLiter(s) IV Continuous <Continuous>    PRN MEDICATIONS  acetaminophen   Tablet .. 650 milliGRAM(s) Oral every 6 hours PRN  ALBUTerol    90 MICROgram(s) HFA Inhaler 2 Puff(s) Inhalation every 4 hours PRN  ondansetron Injectable 4 milliGRAM(s) IV Push every 6 hours PRN      VITAL SIGNS: Last 24 Hours  T(C): 36.6 (22 Jan 2020 05:05), Max: 38.1 (21 Jan 2020 16:41)  T(F): 97.8 (22 Jan 2020 05:05), Max: 100.5 (21 Jan 2020 16:41)  HR: 74 (22 Jan 2020 05:05) (74 - 104)  BP: 123/57 (22 Jan 2020 05:05) (102/68 - 123/57)  BP(mean): --  RR: 16 (22 Jan 2020 05:05) (16 - 17)  SpO2: 94% (21 Jan 2020 21:23) (91% - 96%)    LABS:                        11.2   4.36  )-----------( 225      ( 22 Jan 2020 07:44 )             32.5     01-21    136  |  99  |  11  ----------------------------<  165<H>  4.0   |  24  |  0.7    Ca    9.3      21 Jan 2020 07:36    TPro  6.9  /  Alb  4.3  /  TBili  0.3  /  DBili  x   /  AST  24  /  ALT  18  /  AlkPhos  72  01-21          Troponin T, Serum: <0.01 ng/mL (01-21-20 @ 09:28)      CARDIAC MARKERS ( 21 Jan 2020 09:28 )  x     / <0.01 ng/mL / x     / x     / x          RADIOLOGY:  < from: CT Angio Chest w/ IV Cont (01.21.20 @ 10:21) >  IMPRESSION:     1.  No central or segmental pulmonary embolus.    2.  Nodular thyroid gland with a large nodule/cluster of centrally cystic nodules measuring up to 6.2 cm in the left lobe. Recommend nonemergent outpatient thyroid ultrasound for further evaluation.    < end of copied text >      PHYSICAL EXAM:    GENERAL: NAD, well-developed, AAOx3  HEENT:  Atraumatic, Normocephalic. EOMI, PERRLA, conjunctiva and sclera clear, No JVD  PULMONARY: Clear to auscultation bilaterally; No wheeze  CARDIOVASCULAR: Regular rate and rhythm   GASTROINTESTINAL: Soft, Nontender, Nondistended  MUSCULOSKELETAL:  2+ Peripheral Pulses  NEUROLOGY: non-focal  SKIN: No rashes    ADMISSION SUMMARY  Patient is a 84y old Female who presents with a chief complaint of SOB  Fever 102F (21 Jan 2020 13:09)  Currently admitted to medicine with the primary diagnosis of SOB (shortness of breath)    ASSESSMENT & PLAN YOLY SCOTT 84y Female  MRN#: [id number]   CODE STATUS: FULL       SUBJECTIVE  Patient is a 84y old Female who presents with a chief complaint of SOB  Fever 102F (21 Jan 2020 13:09)  Currently admitted to medicine with the primary diagnosis of SOB (shortness of breath)    Today is hospital day 1d, and this morning she says she is doing better, diarrhea has improved, more solid stool, last BM 4:30 this AM. No appetite, but no nausea or vomiting. Breathing has improved. Still on 2L NC, saturating 94%.     OBJECTIVE  PAST MEDICAL & SURGICAL HISTORY  COPD (chronic obstructive pulmonary disease)    ALLERGIES:  Allergy Status Unknown    MEDICATIONS:  STANDING MEDICATIONS  albuterol/ipratropium for Nebulization 3 milliLiter(s) Nebulizer every 6 hours  budesonide 160 MICROgram(s)/formoterol 4.5 MICROgram(s) Inhaler 2 Puff(s) Inhalation two times a day  chlorhexidine 4% Liquid 1 Application(s) Topical <User Schedule>  enoxaparin Injectable 40 milliGRAM(s) SubCutaneous daily  guaifenesin/dextromethorphan  Syrup 10 milliLiter(s) Oral every 4 hours  oseltamivir 75 milliGRAM(s) Oral two times a day  sodium chloride 0.9%. 1000 milliLiter(s) IV Continuous <Continuous>    PRN MEDICATIONS  acetaminophen   Tablet .. 650 milliGRAM(s) Oral every 6 hours PRN  ALBUTerol    90 MICROgram(s) HFA Inhaler 2 Puff(s) Inhalation every 4 hours PRN  ondansetron Injectable 4 milliGRAM(s) IV Push every 6 hours PRN      VITAL SIGNS: Last 24 Hours  T(C): 36.6 (22 Jan 2020 05:05), Max: 38.1 (21 Jan 2020 16:41)  T(F): 97.8 (22 Jan 2020 05:05), Max: 100.5 (21 Jan 2020 16:41)  HR: 74 (22 Jan 2020 05:05) (74 - 104)  BP: 123/57 (22 Jan 2020 05:05) (102/68 - 123/57)  BP(mean): --  RR: 16 (22 Jan 2020 05:05) (16 - 17)  SpO2: 94% (21 Jan 2020 21:23) (91% - 96%)    LABS:                        11.2   4.36  )-----------( 225      ( 22 Jan 2020 07:44 )             32.5     01-21    136  |  99  |  11  ----------------------------<  165<H>  4.0   |  24  |  0.7    Ca    9.3      21 Jan 2020 07:36    TPro  6.9  /  Alb  4.3  /  TBili  0.3  /  DBili  x   /  AST  24  /  ALT  18  /  AlkPhos  72  01-21          Troponin T, Serum: <0.01 ng/mL (01-21-20 @ 09:28)      CARDIAC MARKERS ( 21 Jan 2020 09:28 )  x     / <0.01 ng/mL / x     / x     / x          RADIOLOGY:  < from: CT Angio Chest w/ IV Cont (01.21.20 @ 10:21) >  IMPRESSION:     1.  No central or segmental pulmonary embolus.    2.  Nodular thyroid gland with a large nodule/cluster of centrally cystic nodules measuring up to 6.2 cm in the left lobe. Recommend nonemergent outpatient thyroid ultrasound for further evaluation.    < end of copied text >      PHYSICAL EXAM:    GENERAL: NAD, well-developed, AAOx3  HEENT: EOMI, PERRLA, conjunctiva and sclera clear, No JVD  PULMONARY: Clear to auscultation bilaterally; No wheeze  CARDIOVASCULAR: Regular rate and rhythm   GASTROINTESTINAL: Soft, Nontender, Nondistended  MUSCULOSKELETAL:  2+ Peripheral Pulses  NEUROLOGY: non-focal  SKIN: No rashes    ADMISSION SUMMARY  Patient is a 84y old Female who presents with a chief complaint of SOB  Fever 102F (21 Jan 2020 13:09)  Currently admitted to medicine with the primary diagnosis of SOB (shortness of breath)    ASSESSMENT & PLAN  84 F w/ Asthma/COPD presenting with SOB & Diarrhea, found to have a fever 102F, 90% on RA, , CXR and CT neg, and found to be flu A positive.     # SIRS 2/2 FluA complicated by Asthma/COPD Exacerbation  - CT angio negative for SOB   - Wean off oxygen, ambulate as tolerated   - Flu A+, started on tamiflu   - No evidence of PNA on imaging, holding abx  - Duonebs q6hrs + Albuterol prn + Symbicort  - Tylenol prn / Mucinex    # Nausea / Vomiting / Diarrhea. Sxs resolved, likely viral gastroenteritis. '  - diarrhea improving, will continue to monitor. no emesis but no apetite.   - IVF for now until tolerating PO; Full liquid diet advance as tolerated  - If continues, stool studies ordered, add C.diff if watery stools although pt has no recent exposure    - Consider CT A/P if no improvement    # Thyroid nodule (6.2 cm) - outpatient ultrasound as per Dr. Davey   # DVT ppx  # Full code  # Dispo: Home within 24 hours YOLY SCOTT 84y Female  MRN#: [id number]   CODE STATUS: FULL       SUBJECTIVE  Patient is a 84y old Female who presents with a chief complaint of SOB  Fever 102F (21 Jan 2020 13:09)  Currently admitted to medicine with the primary diagnosis of SOB (shortness of breath)    Today is hospital day 1d, and this morning she says she is doing better, diarrhea has improved, more solid stool, last BM 4:30 this AM. No appetite, but no nausea or vomiting. Breathing has improved. Still on 2L NC, saturating 94%.     OBJECTIVE  PAST MEDICAL & SURGICAL HISTORY  COPD (chronic obstructive pulmonary disease)    ALLERGIES:  Allergy Status Unknown    MEDICATIONS:  STANDING MEDICATIONS  albuterol/ipratropium for Nebulization 3 milliLiter(s) Nebulizer every 6 hours  budesonide 160 MICROgram(s)/formoterol 4.5 MICROgram(s) Inhaler 2 Puff(s) Inhalation two times a day  chlorhexidine 4% Liquid 1 Application(s) Topical <User Schedule>  enoxaparin Injectable 40 milliGRAM(s) SubCutaneous daily  guaifenesin/dextromethorphan  Syrup 10 milliLiter(s) Oral every 4 hours  oseltamivir 75 milliGRAM(s) Oral two times a day  sodium chloride 0.9%. 1000 milliLiter(s) IV Continuous <Continuous>    PRN MEDICATIONS  acetaminophen   Tablet .. 650 milliGRAM(s) Oral every 6 hours PRN  ALBUTerol    90 MICROgram(s) HFA Inhaler 2 Puff(s) Inhalation every 4 hours PRN  ondansetron Injectable 4 milliGRAM(s) IV Push every 6 hours PRN      VITAL SIGNS: Last 24 Hours  T(C): 36.6 (22 Jan 2020 05:05), Max: 38.1 (21 Jan 2020 16:41)  T(F): 97.8 (22 Jan 2020 05:05), Max: 100.5 (21 Jan 2020 16:41)  HR: 74 (22 Jan 2020 05:05) (74 - 104)  BP: 123/57 (22 Jan 2020 05:05) (102/68 - 123/57)  BP(mean): --  RR: 16 (22 Jan 2020 05:05) (16 - 17)  SpO2: 94% (21 Jan 2020 21:23) (91% - 96%)    LABS:                        11.2   4.36  )-----------( 225      ( 22 Jan 2020 07:44 )             32.5     01-21    136  |  99  |  11  ----------------------------<  165<H>  4.0   |  24  |  0.7    Ca    9.3      21 Jan 2020 07:36    TPro  6.9  /  Alb  4.3  /  TBili  0.3  /  DBili  x   /  AST  24  /  ALT  18  /  AlkPhos  72  01-21          Troponin T, Serum: <0.01 ng/mL (01-21-20 @ 09:28)      CARDIAC MARKERS ( 21 Jan 2020 09:28 )  x     / <0.01 ng/mL / x     / x     / x          RADIOLOGY:  < from: CT Angio Chest w/ IV Cont (01.21.20 @ 10:21) >  IMPRESSION:     1.  No central or segmental pulmonary embolus.    2.  Nodular thyroid gland with a large nodule/cluster of centrally cystic nodules measuring up to 6.2 cm in the left lobe. Recommend nonemergent outpatient thyroid ultrasound for further evaluation.    < end of copied text >      PHYSICAL EXAM:    GENERAL: NAD, well-developed, AAOx3  HEENT: EOMI, PERRLA, conjunctiva and sclera clear, No JVD  PULMONARY: Clear to auscultation bilaterally; No wheeze  CARDIOVASCULAR: Regular rate and rhythm   GASTROINTESTINAL: Soft, Nontender, Nondistended  MUSCULOSKELETAL:  2+ Peripheral Pulses  NEUROLOGY: non-focal  SKIN: No rashes    ADMISSION SUMMARY  Patient is a 84y old Female who presents with a chief complaint of SOB  Fever 102F (21 Jan 2020 13:09)  Currently admitted to medicine with the primary diagnosis of SOB (shortness of breath)    ASSESSMENT & PLAN  84 F w/ Asthma/COPD presenting with SOB & Diarrhea, found to have a fever 102F, 90% on RA, , CXR and CT neg, and found to be flu A positive.     # SIRS 2/2 FluA complicated by Asthma/COPD Exacerbation  - Tmax 100.5    - CT angio negative for SOB   - Wean off oxygen, ambulate as tolerated   - Flu A+, started on tamiflu   - No evidence of PNA on imaging, holding abx  - Duonebs q6hrs + Albuterol prn + Symbicort  - Tylenol prn / Mucinex    # Nausea / Vomiting / Diarrhea. Sxs resolved, likely viral gastroenteritis. '  - diarrhea improving, will continue to monitor. no emesis but no apetite.   - IVF for now until tolerating PO; Full liquid diet advance as tolerated  - If continues, stool studies ordered, add C.diff if watery stools although pt has no recent exposure    - Consider CT A/P if no improvement    # Thyroid nodule (6.2 cm) - outpatient ultrasound as per Dr. Davey   # DVT ppx  # Full code  # Dispo: Home within 24 hours YOLY SCOTT 84y Female  MRN#: [id number]   CODE STATUS: FULL       SUBJECTIVE  Patient is a 84y old Female who presents with a chief complaint of SOB  Fever 102F (21 Jan 2020 13:09)  Currently admitted to medicine with the primary diagnosis of SOB (shortness of breath)    Today is hospital day 1d, and this morning she says she is doing better, diarrhea has improved, more solid stool, last BM 4:30 this AM. No appetite, but no nausea or vomiting. Breathing has improved. Still on 2L NC, saturating 94%.     OBJECTIVE  PAST MEDICAL & SURGICAL HISTORY  COPD (chronic obstructive pulmonary disease)    ALLERGIES:  Allergy Status Unknown    MEDICATIONS:  STANDING MEDICATIONS  albuterol/ipratropium for Nebulization 3 milliLiter(s) Nebulizer every 6 hours  budesonide 160 MICROgram(s)/formoterol 4.5 MICROgram(s) Inhaler 2 Puff(s) Inhalation two times a day  chlorhexidine 4% Liquid 1 Application(s) Topical <User Schedule>  enoxaparin Injectable 40 milliGRAM(s) SubCutaneous daily  guaifenesin/dextromethorphan  Syrup 10 milliLiter(s) Oral every 4 hours  oseltamivir 75 milliGRAM(s) Oral two times a day  sodium chloride 0.9%. 1000 milliLiter(s) IV Continuous <Continuous>    PRN MEDICATIONS  acetaminophen   Tablet .. 650 milliGRAM(s) Oral every 6 hours PRN  ALBUTerol    90 MICROgram(s) HFA Inhaler 2 Puff(s) Inhalation every 4 hours PRN  ondansetron Injectable 4 milliGRAM(s) IV Push every 6 hours PRN      VITAL SIGNS: Last 24 Hours  T(C): 36.6 (22 Jan 2020 05:05), Max: 38.1 (21 Jan 2020 16:41)  T(F): 97.8 (22 Jan 2020 05:05), Max: 100.5 (21 Jan 2020 16:41)  HR: 74 (22 Jan 2020 05:05) (74 - 104)  BP: 123/57 (22 Jan 2020 05:05) (102/68 - 123/57)  BP(mean): --  RR: 16 (22 Jan 2020 05:05) (16 - 17)  SpO2: 94% (21 Jan 2020 21:23) (91% - 96%)    LABS:                        11.2   4.36  )-----------( 225      ( 22 Jan 2020 07:44 )             32.5     01-21    136  |  99  |  11  ----------------------------<  165<H>  4.0   |  24  |  0.7    Ca    9.3      21 Jan 2020 07:36    TPro  6.9  /  Alb  4.3  /  TBili  0.3  /  DBili  x   /  AST  24  /  ALT  18  /  AlkPhos  72  01-21          Troponin T, Serum: <0.01 ng/mL (01-21-20 @ 09:28)      CARDIAC MARKERS ( 21 Jan 2020 09:28 )  x     / <0.01 ng/mL / x     / x     / x          RADIOLOGY:  < from: CT Angio Chest w/ IV Cont (01.21.20 @ 10:21) >  IMPRESSION:     1.  No central or segmental pulmonary embolus.    2.  Nodular thyroid gland with a large nodule/cluster of centrally cystic nodules measuring up to 6.2 cm in the left lobe. Recommend nonemergent outpatient thyroid ultrasound for further evaluation.    < end of copied text >      PHYSICAL EXAM:    GENERAL: NAD, well-developed, AAOx3  HEENT: EOMI, PERRLA, conjunctiva and sclera clear, No JVD  PULMONARY: Clear to auscultation bilaterally; No wheeze  CARDIOVASCULAR: Regular rate and rhythm   GASTROINTESTINAL: Soft, Nontender, Nondistended  MUSCULOSKELETAL:  2+ Peripheral Pulses  NEUROLOGY: non-focal  SKIN: No rashes    ADMISSION SUMMARY  Patient is a 84y old Female who presents with a chief complaint of SOB  Fever 102F (21 Jan 2020 13:09)  Currently admitted to medicine with the primary diagnosis of SOB (shortness of breath)    ASSESSMENT & PLAN  84 F w/ Asthma/COPD presenting with SOB & Diarrhea, found to have a fever 102F, 90% on RA, , CXR and CT neg, and found to be flu A positive.     # SIRS 2/2 FluA complicated by Asthma/COPD Exacerbation  - Tmax 100.5    - CT angio negative for SOB   - Wean off oxygen, ambulate as tolerated   - Flu A+, started on tamiflu   - No evidence of PNA on imaging, holding abx  - Duonebs q6hrs + Albuterol prn + Symbicort  - Tylenol prn / Mucinex    # Nausea / Vomiting / Diarrhea. Sxs resolved, likely viral gastroenteritis. '  - diarrhea improving, will continue to monitor. no emesis but no apetite.   - dc IVF fluids, advancing diet for dinner   - If continues, stool studies ordered, add C.diff if watery stools although pt has no recent exposure    - Consider CT A/P if no improvement    # Thyroid nodule (6.2 cm) - outpatient ultrasound as per Dr. Davey   # DVT ppx  # Full code  # Dispo: Home within 24 hours

## 2020-01-22 NOTE — PROGRESS NOTE ADULT - ASSESSMENT
asthma/copd exac  sirs  viral ge  influenza    tamiflu  id eval  ivf symptomatic rx  d/w ho  ambulating well

## 2020-01-22 NOTE — PROGRESS NOTE ADULT - SUBJECTIVE AND OBJECTIVE BOX
Patient was seen and examined. Spoke with RN. Chart reviewed.    No events overnight.  Vital Signs Last 24 Hrs  T(F): 97.8 (22 Jan 2020 05:05), Max: 100.5 (21 Jan 2020 16:41)  HR: 74 (22 Jan 2020 05:05) (74 - 95)  BP: 123/57 (22 Jan 2020 05:05) (103/58 - 123/57)  SpO2: 94% (21 Jan 2020 21:23) (91% - 94%)  MEDICATIONS  (STANDING):  albuterol/ipratropium for Nebulization 3 milliLiter(s) Nebulizer every 6 hours  budesonide 160 MICROgram(s)/formoterol 4.5 MICROgram(s) Inhaler 2 Puff(s) Inhalation two times a day  chlorhexidine 4% Liquid 1 Application(s) Topical <User Schedule>  enoxaparin Injectable 40 milliGRAM(s) SubCutaneous daily  guaifenesin/dextromethorphan  Syrup 10 milliLiter(s) Oral every 4 hours  oseltamivir 75 milliGRAM(s) Oral two times a day  sodium chloride 0.9%. 1000 milliLiter(s) (50 mL/Hr) IV Continuous <Continuous>    MEDICATIONS  (PRN):  acetaminophen   Tablet .. 650 milliGRAM(s) Oral every 6 hours PRN Temp greater or equal to 38C (100.4F)  ALBUTerol    90 MICROgram(s) HFA Inhaler 2 Puff(s) Inhalation every 4 hours PRN Shortness of Breath  ondansetron Injectable 4 milliGRAM(s) IV Push every 6 hours PRN Nausea    Labs:                        11.2   4.36  )-----------( 225      ( 22 Jan 2020 07:44 )             32.5                         13.0   9.55  )-----------( 295      ( 21 Jan 2020 07:36 )             37.1     22 Jan 2020 07:44    134    |  101    |  7      ----------------------------<  111    3.9     |  23     |  0.6    21 Jan 2020 07:36    136    |  99     |  11     ----------------------------<  165    4.0     |  24     |  0.7      Ca    8.5        22 Jan 2020 07:44  Ca    9.3        21 Jan 2020 07:36  Phos  2.8       22 Jan 2020 07:44  Mg     1.8       22 Jan 2020 07:44    TPro  5.7    /  Alb  3.5    /  TBili  <0.2   /  DBili  x      /  AST  35     /  ALT  27     /  AlkPhos  54     22 Jan 2020 07:44  TPro  6.9    /  Alb  4.3    /  TBili  0.3    /  DBili  x      /  AST  24     /  ALT  18     /  AlkPhos  72     21 Jan 2020 07:36            Radiology:    General: comfortable, NAD  Neurology: A&Ox3, nonfocal  Head:  Normocephalic, atraumatic  ENT:  Mucosa moist, no ulcerations  Neck:  Supple, no JVD,   Skin: no breakdowns (as per RN)  Resp:coarse br  CV: RRR, S1S2,   GI: Soft, NT, bowel sounds  MS: No edema, + peripheral pulses, FROM all 4 extremity

## 2020-01-23 ENCOUNTER — TRANSCRIPTION ENCOUNTER (OUTPATIENT)
Age: 85
End: 2020-01-23

## 2020-01-23 VITALS
RESPIRATION RATE: 18 BRPM | DIASTOLIC BLOOD PRESSURE: 70 MMHG | HEART RATE: 85 BPM | SYSTOLIC BLOOD PRESSURE: 155 MMHG | TEMPERATURE: 98 F

## 2020-01-23 LAB
ANION GAP SERPL CALC-SCNC: 12 MMOL/L — SIGNIFICANT CHANGE UP (ref 7–14)
BUN SERPL-MCNC: 6 MG/DL — LOW (ref 10–20)
CALCIUM SERPL-MCNC: 8.6 MG/DL — SIGNIFICANT CHANGE UP (ref 8.5–10.1)
CHLORIDE SERPL-SCNC: 101 MMOL/L — SIGNIFICANT CHANGE UP (ref 98–110)
CO2 SERPL-SCNC: 27 MMOL/L — SIGNIFICANT CHANGE UP (ref 17–32)
CREAT SERPL-MCNC: 0.6 MG/DL — LOW (ref 0.7–1.5)
GLUCOSE SERPL-MCNC: 102 MG/DL — HIGH (ref 70–99)
HCT VFR BLD CALC: 33.4 % — LOW (ref 37–47)
HGB BLD-MCNC: 11.4 G/DL — LOW (ref 12–16)
MAGNESIUM SERPL-MCNC: 1.9 MG/DL — SIGNIFICANT CHANGE UP (ref 1.8–2.4)
MCHC RBC-ENTMCNC: 31.8 PG — HIGH (ref 27–31)
MCHC RBC-ENTMCNC: 34.1 G/DL — SIGNIFICANT CHANGE UP (ref 32–37)
MCV RBC AUTO: 93.3 FL — SIGNIFICANT CHANGE UP (ref 81–99)
NRBC # BLD: 0 /100 WBCS — SIGNIFICANT CHANGE UP (ref 0–0)
PLATELET # BLD AUTO: 235 K/UL — SIGNIFICANT CHANGE UP (ref 130–400)
POTASSIUM SERPL-MCNC: 4 MMOL/L — SIGNIFICANT CHANGE UP (ref 3.5–5)
POTASSIUM SERPL-SCNC: 4 MMOL/L — SIGNIFICANT CHANGE UP (ref 3.5–5)
RBC # BLD: 3.58 M/UL — LOW (ref 4.2–5.4)
RBC # FLD: 13.8 % — SIGNIFICANT CHANGE UP (ref 11.5–14.5)
SODIUM SERPL-SCNC: 140 MMOL/L — SIGNIFICANT CHANGE UP (ref 135–146)
WBC # BLD: 3.01 K/UL — LOW (ref 4.8–10.8)
WBC # FLD AUTO: 3.01 K/UL — LOW (ref 4.8–10.8)

## 2020-01-23 RX ORDER — ACETAMINOPHEN 500 MG
650 TABLET ORAL ONCE
Refills: 0 | Status: COMPLETED | OUTPATIENT
Start: 2020-01-23 | End: 2020-01-23

## 2020-01-23 RX ADMIN — Medication 650 MILLIGRAM(S): at 02:38

## 2020-01-23 RX ADMIN — Medication 10 MILLILITER(S): at 05:23

## 2020-01-23 RX ADMIN — Medication 3 MILLILITER(S): at 09:24

## 2020-01-23 RX ADMIN — Medication 75 MILLIGRAM(S): at 05:23

## 2020-01-23 RX ADMIN — Medication 10 MILLILITER(S): at 02:04

## 2020-01-23 RX ADMIN — Medication 650 MILLIGRAM(S): at 02:08

## 2020-01-23 NOTE — DISCHARGE NOTE PROVIDER - NSDCCPTREATMENT_GEN_ALL_CORE_FT
PRINCIPAL PROCEDURE  Procedure: Ultrasound thyroid  Findings and Treatment: Please see your primary care doctor for a referral to get an ultrasound of your thyroid.

## 2020-01-23 NOTE — DISCHARGE NOTE PROVIDER - NSDCMRMEDTOKEN_GEN_ALL_CORE_FT
albuterol 90 mcg/inh inhalation aerosol: 2 puff(s) inhaled 4 times a day, As Needed  oseltamivir 75 mg oral capsule: 1 cap(s) orally 2 times a day

## 2020-01-23 NOTE — DISCHARGE NOTE PROVIDER - PROVIDER TOKENS
PROVIDER:[TOKEN:[[id number]],FOLLOWUP:[2 weeks],ESTABLISHEDPATIENT:[T]],PROVIDER:[TOKEN:[[id number]],FOLLOWUP:[Routine]]

## 2020-01-23 NOTE — CONSULT NOTE ADULT - SUBJECTIVE AND OBJECTIVE BOX
YOLY SCOTT  MRN-[id number]    HISTORY OF PRESENT ILLNESS:    84 F w/ PMH of Asthma/COPD not on home O2, denies any other significant PMH, presenting to the ER because of SOB. The pt says for the past 1-2 days she has been having increasing dyspnea, as well as productive cough w/ clear-pinkish sputum, also, diarrhea, 6 episodes yesterday, non-bloody, described as somewhat watery, nausea and vomiting. Pt did return from [place] on Thursday, denies being sick during the trip, says she did have some diarrhea before the trip, that resolved and came back yesterday.    In the ED, pt was pox of 90% on RA, placed on 2L O2 -> 96% pox, had a T: 102F, and , BP: 102/68.  A CT angio was neg for PE, or consolidation.  Given 1L IVF, Tylenol, and Zofran. Admitted for further workup. (21 Jan 2020 13:09)      PMH/PSH:  PAST MEDICAL & SURGICAL HISTORY:  COPD (chronic obstructive pulmonary disease)    ALLERGIES:  Allergies    Allergy Status Unknown    Intolerances      SOCIAL HABITS:  ex smoker   quit 1980    FAMILY HISTORY:   n/c      REVIEW OF SYSTEM:  Elements of review of systems are negative or non-applicable except as noted above in HPI section.       HOME MEDICATIONS:  albuterol 90 mcg/inh inhalation aerosol    MEDICATIONS:  MEDICATIONS  (STANDING):  albuterol/ipratropium for Nebulization 3 milliLiter(s) Nebulizer every 6 hours  budesonide 160 MICROgram(s)/formoterol 4.5 MICROgram(s) Inhaler 2 Puff(s) Inhalation two times a day  chlorhexidine 4% Liquid 1 Application(s) Topical <User Schedule>  enoxaparin Injectable 40 milliGRAM(s) SubCutaneous daily  guaifenesin/dextromethorphan  Syrup 10 milliLiter(s) Oral every 4 hours  oseltamivir 75 milliGRAM(s) Oral two times a day    MEDICATIONS  (PRN):  acetaminophen   Tablet .. 650 milliGRAM(s) Oral every 6 hours PRN Temp greater or equal to 38C (100.4F)  ALBUTerol    90 MICROgram(s) HFA Inhaler 2 Puff(s) Inhalation every 4 hours PRN Shortness of Breath  ondansetron Injectable 4 milliGRAM(s) IV Push every 6 hours PRN Nausea        VITALS:   Vital Signs Last 24 Hrs  T(C): 36.6 (23 Jan 2020 05:12), Max: 36.8 (22 Jan 2020 19:45)  T(F): 97.8 (23 Jan 2020 05:12), Max: 98.2 (22 Jan 2020 19:45)  HR: 86 (23 Jan 2020 05:12) (86 - 107)  BP: 110/58 (23 Jan 2020 05:12) (105/55 - 135/62)  BP(mean): --  RR: 16 (23 Jan 2020 05:12) (16 - 18)  SpO2: --        PHYSICAL EXAM:    GENERAL: NAD  HEAD:  Atraumatic, Normocephalic  NECK: Supple, No JVD  CHEST/LUNG: Clear to auscultation bilaterally;   HEART: Regular rate and rhythm; No murmurs  ABDOMEN: Soft, Nontender, Nondistended  EXTREMITIES:  Good peripheral Pulses, No clubbing, cyanosis, or edema      LABS:                        11.4   3.01  )-----------( 235      ( 23 Jan 2020 07:36 )             33.4     01-23    140  |  101  |  6<L>  ----------------------------<  102<H>  4.0   |  27  |  0.6<L>    Ca    8.6      23 Jan 2020 07:36  Phos  2.8     01-22  Mg     1.9     01-23    TPro  5.7<L>  /  Alb  3.5  /  TBili  <0.2  /  DBili  x   /  AST  35  /  ALT  27  /  AlkPhos  54  01-22    LIVER FUNCTIONS - ( 22 Jan 2020 07:44 )  Alb: 3.5 g/dL / Pro: 5.7 g/dL / ALK PHOS: 54 U/L / ALT: 27 U/L / AST: 35 U/L / GGT: x                     DIAGNOSTIC STUDIES:    < from: CT Angio Chest w/ IV Cont (01.21.20 @ 10:21) >    1.  No central or segmental pulmonary embolus.    2.  Nodular thyroid gland with a large nodule/cluster of centrally cystic nodules measuring up to 6.2 cm in the left lobe. Recommend nonemergent outpatient thyroid ultrasound for further evaluation.    < end of copied text >

## 2020-01-23 NOTE — DISCHARGE NOTE PROVIDER - CARE PROVIDER_API CALL
Gino Way ()  Infectious Disease; Internal Medicine  [address] [place] [address]  Phone: [phone]  Fax: [phone]  Established Patient  Follow Up Time: 2 weeks    Son Greer)  Critical Care Medicine; Internal Medicine; Pulmonary Disease; Sleep Medicine  [address] [place] [address]  Phone: [phone]  Fax: [phone]  Follow Up Time: Routine

## 2020-01-23 NOTE — DISCHARGE NOTE NURSING/CASE MANAGEMENT/SOCIAL WORK - PATIENT PORTAL LINK FT
You can access the FollowMyHealth Patient Portal offered by [facility] by registering at the following website: [email or web address]. By joining [email or web address]’s FollowMyHealth portal, you will also be able to view your health information using other applications (apps) compatible with our system.

## 2020-01-23 NOTE — CONSULT NOTE ADULT - ASSESSMENT
83 yo with COPD admitted with sob cough found to have Influenza    SOb improved  Influenza  COPD compensated    albuterol nebs prn  cont tamiflu x 5 days  respiratory isolation  d/c symbicort  outpt pfts  d/c planning

## 2020-01-23 NOTE — PROGRESS NOTE ADULT - SUBJECTIVE AND OBJECTIVE BOX
Patient was seen and examined. Spoke with RN. Chart reviewed.    No events overnight.  Vital Signs Last 24 Hrs  T(F): 97.8 (23 Jan 2020 05:12), Max: 98.2 (22 Jan 2020 19:45)  HR: 86 (23 Jan 2020 05:12) (86 - 107)  BP: 110/58 (23 Jan 2020 05:12) (110/58 - 135/62)  SpO2: --  MEDICATIONS  (STANDING):  albuterol/ipratropium for Nebulization 3 milliLiter(s) Nebulizer every 6 hours  budesonide 160 MICROgram(s)/formoterol 4.5 MICROgram(s) Inhaler 2 Puff(s) Inhalation two times a day  chlorhexidine 4% Liquid 1 Application(s) Topical <User Schedule>  enoxaparin Injectable 40 milliGRAM(s) SubCutaneous daily  guaifenesin/dextromethorphan  Syrup 10 milliLiter(s) Oral every 4 hours  oseltamivir 75 milliGRAM(s) Oral two times a day    MEDICATIONS  (PRN):  acetaminophen   Tablet .. 650 milliGRAM(s) Oral every 6 hours PRN Temp greater or equal to 38C (100.4F)  ALBUTerol    90 MICROgram(s) HFA Inhaler 2 Puff(s) Inhalation every 4 hours PRN Shortness of Breath  ondansetron Injectable 4 milliGRAM(s) IV Push every 6 hours PRN Nausea    Labs:                        11.4   3.01  )-----------( 235      ( 23 Jan 2020 07:36 )             33.4                         11.2   4.36  )-----------( 225      ( 22 Jan 2020 07:44 )             32.5     23 Jan 2020 07:36    140    |  101    |  6      ----------------------------<  102    4.0     |  27     |  0.6    22 Jan 2020 07:44    134    |  101    |  7      ----------------------------<  111    3.9     |  23     |  0.6      Ca    8.6        23 Jan 2020 07:36  Ca    8.5        22 Jan 2020 07:44  Phos  2.8       22 Jan 2020 07:44  Mg     1.9       23 Jan 2020 07:36  Mg     1.8       22 Jan 2020 07:44    TPro  5.7    /  Alb  3.5    /  TBili  <0.2   /  DBili  x      /  AST  35     /  ALT  27     /  AlkPhos  54     22 Jan 2020 07:44            Radiology:    General: comfortable, NAD  Neurology: A&Ox3, nonfocal  Head:  Normocephalic, atraumatic  ENT:  Mucosa moist, no ulcerations  Neck:  Supple, no JVD,   Skin: no breakdowns (as per RN)  Resp: CTA B/L  CV: RRR, S1S2,   GI: Soft, NT, bowel sounds  MS: No edema, + peripheral pulses, FROM all 4 extremity

## 2020-01-23 NOTE — DISCHARGE NOTE PROVIDER - HOSPITAL COURSE
84 F w/ Asthma/COPD presenting with SOB & Diarrhea, found to have a fever 102F, 90% on RA, , CXR and CT neg, and found to be flu A positive. She was started on oxygen, tamiflu and supportive measures. During her hospital stay, her nausea/vomiting/diarrhea resolved, she tolerated PO intake. She remained afebrile and has been ambulating without oxygen.         Her CT showed an incidental finding of a thyroid nodule (6.2 cm) - outpatient ultrasound as per Dr. Davey.

## 2020-01-23 NOTE — DISCHARGE NOTE PROVIDER - NSDCCPCAREPLAN_GEN_ALL_CORE_FT
PRINCIPAL DISCHARGE DIAGNOSIS  Diagnosis: Influenza A  Assessment and Plan of Treatment: You were admitted in the hospital for shortness of breath and found to be influenza A PRINCIPAL DISCHARGE DIAGNOSIS  Diagnosis: Influenza A  Assessment and Plan of Treatment: You were admitted in the hospital for shortness of breath and found to be influenza A positive. You were started on tamiflu and have 3 more days of medication left. Please complete the full course and follow up with your primary care doctor.  Return the ED if you have a high fever or any of the following:   You develop shortness of breath or difficulty breathing. Your skin or nails turn a bluish color.You have severe pain or stiffness in your neck. You develop a sudden headache or sudden pain in your face or ear. You cannot eat or drink without vomiting  Influenza, more commonly known as "the flu," is a viral infection that primarily affects your respiratory tract. Symptoms of the flu usually begin suddenly and last 4–14 days. Getting an annual flu shot is the best way to prevent getting the flu. Stay home from work or school as told by your health care provider. Unless you are visiting your health care provider, avoid leaving home until your fever has been gone for 24 hours without taking medicine. Keep all follow-up visits as told by your health care provider. This is important.      SECONDARY DISCHARGE DIAGNOSES  Diagnosis: Thyroid nodule  Assessment and Plan of Treatment: When you had the CT scan done it showed a thyroid nodule of 6.2 cm. You need to get an ultrasound done when you leave the hospital for further evaluation.

## 2020-01-28 DIAGNOSIS — J10.2 INFLUENZA DUE TO OTHER IDENTIFIED INFLUENZA VIRUS WITH GASTROINTESTINAL MANIFESTATIONS: ICD-10-CM

## 2020-01-28 DIAGNOSIS — E04.1 NONTOXIC SINGLE THYROID NODULE: ICD-10-CM

## 2020-01-28 DIAGNOSIS — R06.02 SHORTNESS OF BREATH: ICD-10-CM

## 2020-01-28 DIAGNOSIS — J44.1 CHRONIC OBSTRUCTIVE PULMONARY DISEASE WITH (ACUTE) EXACERBATION: ICD-10-CM

## 2020-01-28 DIAGNOSIS — Z87.891 PERSONAL HISTORY OF NICOTINE DEPENDENCE: ICD-10-CM

## 2020-01-28 DIAGNOSIS — R09.02 HYPOXEMIA: ICD-10-CM

## 2020-01-28 DIAGNOSIS — J45.901 UNSPECIFIED ASTHMA WITH (ACUTE) EXACERBATION: ICD-10-CM

## 2020-09-29 ENCOUNTER — TRANSCRIPTION ENCOUNTER (OUTPATIENT)
Age: 85
End: 2020-09-29

## 2020-11-06 NOTE — ED PROVIDER NOTE - PRINCIPAL DIAGNOSIS
Detail Level: Zone Other (Free Text): Mohs 2/16/20 Note Text (......Xxx Chief Complaint.): This diagnosis correlates with the SOB (shortness of breath)

## 2021-04-13 ENCOUNTER — OUTPATIENT (OUTPATIENT)
Dept: OUTPATIENT SERVICES | Facility: HOSPITAL | Age: 86
LOS: 1 days | Discharge: HOME | End: 2021-04-13
Payer: MEDICARE

## 2021-04-13 DIAGNOSIS — J44.0 CHRONIC OBSTRUCTIVE PULMONARY DISEASE WITH (ACUTE) LOWER RESPIRATORY INFECTION: ICD-10-CM

## 2021-04-13 PROCEDURE — 71046 X-RAY EXAM CHEST 2 VIEWS: CPT | Mod: 26

## 2021-05-10 PROBLEM — Z00.00 ENCOUNTER FOR PREVENTIVE HEALTH EXAMINATION: Status: ACTIVE | Noted: 2021-05-10

## 2021-05-12 ENCOUNTER — APPOINTMENT (OUTPATIENT)
Dept: PULMONOLOGY | Facility: CLINIC | Age: 86
End: 2021-05-12
Payer: MEDICARE

## 2021-05-12 VITALS
DIASTOLIC BLOOD PRESSURE: 72 MMHG | RESPIRATION RATE: 14 BRPM | SYSTOLIC BLOOD PRESSURE: 112 MMHG | OXYGEN SATURATION: 98 % | HEART RATE: 87 BPM | BODY MASS INDEX: 18.96 KG/M2 | WEIGHT: 107 LBS | HEIGHT: 63 IN

## 2021-05-12 DIAGNOSIS — Z86.79 PERSONAL HISTORY OF OTHER DISEASES OF THE CIRCULATORY SYSTEM: ICD-10-CM

## 2021-05-12 PROCEDURE — 99203 OFFICE O/P NEW LOW 30 MIN: CPT

## 2021-05-12 NOTE — HISTORY OF PRESENT ILLNESS
[Initial Eval - Existing Diagnosis] : an initial evaluation of an existing diagnosis of [Dyspnea on Exertion] : dyspnea on exertion [Currently Experiencing] : The patient is currently experiencing symptoms. [Albuterol (Inhalation)] : albuterol by inhalation [Short-Acting Beta Agonists] : short-acting beta agonists [Long-Acting Beta Agonists] : long-acting beta agonists [Inhaled Corticosteroids] : inhaled corticosteroids [Good Compliance] : good compliance with treatment [Good Tolerance] : good tolerance of treatment [Good Symptom Control] : good symptom control [Smoking (Has Quit)] : smoking (has quit) [Unlimited ADLs] : able to do activities of daily living without limitations [Dyspnea] : no dyspnea [Wheezing] : no wheezing [Non-Productive Cough] : no non-productive cough [Fever] : no fever [Weakness] : no weakness

## 2021-05-12 NOTE — DISCUSSION/SUMMARY
[FreeTextEntry1] : 1- COPD EX SMOKER, CHEST CT LAST YEAR FROM [facility] REVIEWED/ CXR RESULT\par 2- SOB ON EXERTION\par 3- PLAN PFT\par 4- REVIEW CARDIO NOTE

## 2021-11-09 ENCOUNTER — OUTPATIENT (OUTPATIENT)
Dept: OUTPATIENT SERVICES | Facility: HOSPITAL | Age: 86
LOS: 1 days | Discharge: HOME | End: 2021-11-09
Payer: MEDICARE

## 2021-11-09 DIAGNOSIS — Z01.818 ENCOUNTER FOR OTHER PREPROCEDURAL EXAMINATION: ICD-10-CM

## 2021-11-09 PROCEDURE — 71046 X-RAY EXAM CHEST 2 VIEWS: CPT | Mod: 26

## 2022-01-30 ENCOUNTER — EMERGENCY (EMERGENCY)
Facility: HOSPITAL | Age: 87
LOS: 0 days | Discharge: HOME | End: 2022-01-30
Admitting: INTERNAL MEDICINE

## 2022-01-30 ENCOUNTER — INPATIENT (INPATIENT)
Facility: HOSPITAL | Age: 87
LOS: 0 days | Discharge: HOME | End: 2022-01-31
Attending: INTERNAL MEDICINE | Admitting: INTERNAL MEDICINE
Payer: MEDICARE

## 2022-01-30 VITALS
RESPIRATION RATE: 18 BRPM | TEMPERATURE: 98 F | SYSTOLIC BLOOD PRESSURE: 137 MMHG | WEIGHT: 95.02 LBS | OXYGEN SATURATION: 99 % | HEART RATE: 101 BPM | DIASTOLIC BLOOD PRESSURE: 87 MMHG

## 2022-01-30 DIAGNOSIS — Z99.81 DEPENDENCE ON SUPPLEMENTAL OXYGEN: ICD-10-CM

## 2022-01-30 DIAGNOSIS — N39.0 URINARY TRACT INFECTION, SITE NOT SPECIFIED: ICD-10-CM

## 2022-01-30 DIAGNOSIS — J44.1 CHRONIC OBSTRUCTIVE PULMONARY DISEASE WITH (ACUTE) EXACERBATION: ICD-10-CM

## 2022-01-30 DIAGNOSIS — R10.13 EPIGASTRIC PAIN: ICD-10-CM

## 2022-01-30 DIAGNOSIS — R79.89 OTHER SPECIFIED ABNORMAL FINDINGS OF BLOOD CHEMISTRY: ICD-10-CM

## 2022-01-30 DIAGNOSIS — K83.8 OTHER SPECIFIED DISEASES OF BILIARY TRACT: ICD-10-CM

## 2022-01-30 LAB
ALBUMIN SERPL ELPH-MCNC: 5.4 G/DL — HIGH (ref 3.5–5.2)
ALP SERPL-CCNC: 96 U/L — SIGNIFICANT CHANGE UP (ref 30–115)
ALT FLD-CCNC: 18 U/L — SIGNIFICANT CHANGE UP (ref 0–41)
ANION GAP SERPL CALC-SCNC: 15 MMOL/L — HIGH (ref 7–14)
APPEARANCE UR: CLEAR — SIGNIFICANT CHANGE UP
AST SERPL-CCNC: 20 U/L — SIGNIFICANT CHANGE UP (ref 0–41)
BACTERIA # UR AUTO: NEGATIVE — SIGNIFICANT CHANGE UP
BASOPHILS # BLD AUTO: 0.01 K/UL — SIGNIFICANT CHANGE UP (ref 0–0.2)
BASOPHILS NFR BLD AUTO: 0.1 % — SIGNIFICANT CHANGE UP (ref 0–1)
BILIRUB SERPL-MCNC: 0.3 MG/DL — SIGNIFICANT CHANGE UP (ref 0.2–1.2)
BILIRUB UR-MCNC: NEGATIVE — SIGNIFICANT CHANGE UP
BUN SERPL-MCNC: 25 MG/DL — HIGH (ref 10–20)
CALCIUM SERPL-MCNC: 10.5 MG/DL — HIGH (ref 8.5–10.1)
CHLORIDE SERPL-SCNC: 96 MMOL/L — LOW (ref 98–110)
CO2 SERPL-SCNC: 25 MMOL/L — SIGNIFICANT CHANGE UP (ref 17–32)
COLOR SPEC: SIGNIFICANT CHANGE UP
CREAT SERPL-MCNC: 0.9 MG/DL — SIGNIFICANT CHANGE UP (ref 0.7–1.5)
DIFF PNL FLD: NEGATIVE — SIGNIFICANT CHANGE UP
EOSINOPHIL # BLD AUTO: 0 K/UL — SIGNIFICANT CHANGE UP (ref 0–0.7)
EOSINOPHIL NFR BLD AUTO: 0 % — SIGNIFICANT CHANGE UP (ref 0–8)
EPI CELLS # UR: 3 /HPF — SIGNIFICANT CHANGE UP (ref 0–5)
GLUCOSE SERPL-MCNC: 119 MG/DL — HIGH (ref 70–99)
GLUCOSE UR QL: NEGATIVE — SIGNIFICANT CHANGE UP
HCT VFR BLD CALC: 41.2 % — SIGNIFICANT CHANGE UP (ref 37–47)
HGB BLD-MCNC: 13.9 G/DL — SIGNIFICANT CHANGE UP (ref 12–16)
HYALINE CASTS # UR AUTO: 2 /LPF — SIGNIFICANT CHANGE UP (ref 0–7)
IMM GRANULOCYTES NFR BLD AUTO: 0.4 % — HIGH (ref 0.1–0.3)
KETONES UR-MCNC: ABNORMAL
LACTATE SERPL-SCNC: 2.3 MMOL/L — HIGH (ref 0.7–2)
LEUKOCYTE ESTERASE UR-ACNC: ABNORMAL
LIDOCAIN IGE QN: 22 U/L — SIGNIFICANT CHANGE UP (ref 7–60)
LYMPHOCYTES # BLD AUTO: 1.15 K/UL — LOW (ref 1.2–3.4)
LYMPHOCYTES # BLD AUTO: 10.7 % — LOW (ref 20.5–51.1)
MCHC RBC-ENTMCNC: 29.9 PG — SIGNIFICANT CHANGE UP (ref 27–31)
MCHC RBC-ENTMCNC: 33.7 G/DL — SIGNIFICANT CHANGE UP (ref 32–37)
MCV RBC AUTO: 88.6 FL — SIGNIFICANT CHANGE UP (ref 81–99)
MONOCYTES # BLD AUTO: 0.68 K/UL — HIGH (ref 0.1–0.6)
MONOCYTES NFR BLD AUTO: 6.3 % — SIGNIFICANT CHANGE UP (ref 1.7–9.3)
NEUTROPHILS # BLD AUTO: 8.83 K/UL — HIGH (ref 1.4–6.5)
NEUTROPHILS NFR BLD AUTO: 82.5 % — HIGH (ref 42.2–75.2)
NITRITE UR-MCNC: NEGATIVE — SIGNIFICANT CHANGE UP
NRBC # BLD: 0 /100 WBCS — SIGNIFICANT CHANGE UP (ref 0–0)
PH UR: 6 — SIGNIFICANT CHANGE UP (ref 5–8)
PLATELET # BLD AUTO: 474 K/UL — HIGH (ref 130–400)
POTASSIUM SERPL-MCNC: 4.1 MMOL/L — SIGNIFICANT CHANGE UP (ref 3.5–5)
POTASSIUM SERPL-SCNC: 4.1 MMOL/L — SIGNIFICANT CHANGE UP (ref 3.5–5)
PROT SERPL-MCNC: 8.2 G/DL — HIGH (ref 6–8)
PROT UR-MCNC: ABNORMAL
RBC # BLD: 4.65 M/UL — SIGNIFICANT CHANGE UP (ref 4.2–5.4)
RBC # FLD: 14.9 % — HIGH (ref 11.5–14.5)
RBC CASTS # UR COMP ASSIST: 2 /HPF — SIGNIFICANT CHANGE UP (ref 0–4)
SARS-COV-2 RNA SPEC QL NAA+PROBE: SIGNIFICANT CHANGE UP
SODIUM SERPL-SCNC: 136 MMOL/L — SIGNIFICANT CHANGE UP (ref 135–146)
SP GR SPEC: >1.05 (ref 1.01–1.03)
TROPONIN T SERPL-MCNC: 0.02 NG/ML — HIGH
UROBILINOGEN FLD QL: SIGNIFICANT CHANGE UP
WBC # BLD: 10.71 K/UL — SIGNIFICANT CHANGE UP (ref 4.8–10.8)
WBC # FLD AUTO: 10.71 K/UL — SIGNIFICANT CHANGE UP (ref 4.8–10.8)
WBC UR QL: 15 /HPF — HIGH (ref 0–5)

## 2022-01-30 PROCEDURE — 74177 CT ABD & PELVIS W/CONTRAST: CPT | Mod: 26,MA

## 2022-01-30 PROCEDURE — 93010 ELECTROCARDIOGRAM REPORT: CPT

## 2022-01-30 PROCEDURE — 76705 ECHO EXAM OF ABDOMEN: CPT | Mod: 26

## 2022-01-30 PROCEDURE — 99285 EMERGENCY DEPT VISIT HI MDM: CPT | Mod: GC

## 2022-01-30 RX ORDER — ALBUTEROL 90 UG/1
2 AEROSOL, METERED ORAL
Qty: 0 | Refills: 0 | DISCHARGE

## 2022-01-30 RX ORDER — ENOXAPARIN SODIUM 100 MG/ML
40 INJECTION SUBCUTANEOUS DAILY
Refills: 0 | Status: DISCONTINUED | OUTPATIENT
Start: 2022-01-30 | End: 2022-01-31

## 2022-01-30 RX ORDER — SODIUM CHLORIDE 9 MG/ML
2000 INJECTION, SOLUTION INTRAVENOUS ONCE
Refills: 0 | Status: COMPLETED | OUTPATIENT
Start: 2022-01-30 | End: 2022-01-30

## 2022-01-30 RX ORDER — MORPHINE SULFATE 50 MG/1
4 CAPSULE, EXTENDED RELEASE ORAL ONCE
Refills: 0 | Status: DISCONTINUED | OUTPATIENT
Start: 2022-01-30 | End: 2022-01-30

## 2022-01-30 RX ORDER — FLUTICASONE PROPIONATE AND SALMETEROL 50; 250 UG/1; UG/1
2 POWDER ORAL; RESPIRATORY (INHALATION)
Qty: 0 | Refills: 0 | DISCHARGE

## 2022-01-30 RX ORDER — SODIUM CHLORIDE 9 MG/ML
1000 INJECTION, SOLUTION INTRAVENOUS
Refills: 0 | Status: DISCONTINUED | OUTPATIENT
Start: 2022-01-30 | End: 2022-01-31

## 2022-01-30 RX ORDER — FAMOTIDINE 10 MG/ML
20 INJECTION INTRAVENOUS ONCE
Refills: 0 | Status: COMPLETED | OUTPATIENT
Start: 2022-01-30 | End: 2022-01-30

## 2022-01-30 RX ORDER — CHLORHEXIDINE GLUCONATE 213 G/1000ML
1 SOLUTION TOPICAL
Refills: 0 | Status: DISCONTINUED | OUTPATIENT
Start: 2022-01-30 | End: 2022-01-31

## 2022-01-30 RX ORDER — CEFTRIAXONE 500 MG/1
1000 INJECTION, POWDER, FOR SOLUTION INTRAMUSCULAR; INTRAVENOUS ONCE
Refills: 0 | Status: COMPLETED | OUTPATIENT
Start: 2022-01-30 | End: 2022-01-30

## 2022-01-30 RX ORDER — ACETAMINOPHEN 500 MG
650 TABLET ORAL EVERY 6 HOURS
Refills: 0 | Status: DISCONTINUED | OUTPATIENT
Start: 2022-01-30 | End: 2022-01-31

## 2022-01-30 RX ORDER — PANTOPRAZOLE SODIUM 20 MG/1
40 TABLET, DELAYED RELEASE ORAL
Refills: 0 | Status: DISCONTINUED | OUTPATIENT
Start: 2022-01-30 | End: 2022-01-31

## 2022-01-30 RX ORDER — MORPHINE SULFATE 50 MG/1
2 CAPSULE, EXTENDED RELEASE ORAL EVERY 6 HOURS
Refills: 0 | Status: DISCONTINUED | OUTPATIENT
Start: 2022-01-30 | End: 2022-01-31

## 2022-01-30 RX ORDER — SODIUM CHLORIDE 9 MG/ML
1000 INJECTION, SOLUTION INTRAVENOUS ONCE
Refills: 0 | Status: COMPLETED | OUTPATIENT
Start: 2022-01-30 | End: 2022-01-30

## 2022-01-30 RX ORDER — ONDANSETRON 8 MG/1
4 TABLET, FILM COATED ORAL ONCE
Refills: 0 | Status: COMPLETED | OUTPATIENT
Start: 2022-01-30 | End: 2022-01-30

## 2022-01-30 RX ADMIN — SODIUM CHLORIDE 1000 MILLILITER(S): 9 INJECTION, SOLUTION INTRAVENOUS at 17:38

## 2022-01-30 RX ADMIN — SODIUM CHLORIDE 2000 MILLILITER(S): 9 INJECTION, SOLUTION INTRAVENOUS at 12:20

## 2022-01-30 RX ADMIN — CEFTRIAXONE 100 MILLIGRAM(S): 500 INJECTION, POWDER, FOR SOLUTION INTRAMUSCULAR; INTRAVENOUS at 19:05

## 2022-01-30 RX ADMIN — SODIUM CHLORIDE 100 MILLILITER(S): 9 INJECTION, SOLUTION INTRAVENOUS at 21:54

## 2022-01-30 RX ADMIN — MORPHINE SULFATE 4 MILLIGRAM(S): 50 CAPSULE, EXTENDED RELEASE ORAL at 12:10

## 2022-01-30 RX ADMIN — ONDANSETRON 4 MILLIGRAM(S): 8 TABLET, FILM COATED ORAL at 12:07

## 2022-01-30 RX ADMIN — FAMOTIDINE 20 MILLIGRAM(S): 10 INJECTION INTRAVENOUS at 12:07

## 2022-01-30 NOTE — H&P ADULT - ASSESSMENT
Assessment and Plan  Case of an 86 year old female patient with Asthma/COPD and history of cervical cancer s/p TAHBSO who presented to the ED on 01/30 for evaluation of 2 days of intermittent lower quadrant abdominal pain associated with nausea and vomiting, found to have evidence of cholelithiasis with CBD and main PD dilation on imaging, to be admitted for further investigations, management, and monitoring. Currently hemodynamically stable.      Cholelithiasis  Dilated CBD and Main Pancreatic Duct: Rule Out Obstruction (Pancreatic Mass)  Left Hepatic Lobe Hypodensity 1.3cm  * Last colonoscopy many years ago: insignificant per patient  * ED Vitals /87mmHg, , no fever  * ED Labs WBC 10.71, Hb 13.9, Plt 474, T bili 0.3, ALP 96, AST 20, ALT 18, Lipase 22, Troponin 0.02, LA 2.3  * US Abdomen Upper Quadrant Right (01.30.22 @ 13:47)Mildly dilated common bile duct and proximal pancreatic duct. An  obstructing process is not excluded Further evaluation with pancreatic MRI with gadolinium and MRCP is recommended Cholelithiasis.  * CT Abdomen and Pelvis w/ IV Cont (01.30.22 @ 14:10) Cholelithiasis, better visualized on ultrasound from the same day. No CT evidence of cholecystitis. Distended urinary bladder, Septated peripherally calcified centrally hypodense structure noted within the spleen measuring 2.2 x 1.5 cm. Finding is nonspecific, however differential includes parasitic infection. Recommend ultrasound for  further evaluation. Indeterminate left hepatic lobe hypodensity measuring 1.3 cm.  Nonemergent/outpatient MRI hepatic protocol can be obtained for further  evaluation. Tree-in-bud nodularity of the left lower lobe, may be seen with small  airway disease.  * S/P IV 3L LR, Morphine and Zofran in ED    - Gastroenterology Team consulted  - Check MRCP and MR pancreatic protocol to further evaluate pancreatic and CBD ducts and to rule out obstruction from a pancreatic or other mass  - Keep patient NPO and start gentle IV fluid hydration. Trend LA 2.3 in ED-> repeat  - Trend WBC 10.71  - Monitor T afebrile  - Trend LFTs daily (not CMP)  - Check hepatic function panel  - Check coagulation profile  - Monitor pain and keep score at 01/10: tylenol and morphine PRN  - Monitor nausea and start IV Zofran 4mg Q8h PRN  - Consider MR hepatic protocol to better evaluate the left hepatic lobe hypodensity  - Start Protonix 40mg QD      Septated Splenic Mass 2.2 x 1.5cm  Rule Out Parasitic Infection  * CT Abdomen and Pelvis w/ IV Cont (01.30.22 @ 14:10) Cholelithiasis, better visualized on ultrasound from the same day. No CT evidence of cholecystitis. Distended urinary bladder, Septated peripherally calcified centrally hypodense structure noted within the spleen measuring 2.2 x 1.5 cm. Finding is nonspecific, however differential includes parasitic infection. Recommend ultrasound for  further evaluation. Indeterminate left hepatic lobe hypodensity measuring 1.3 cm.  Nonemergent/outpatient MRI hepatic protocol can be obtained for further  evaluation. Tree-in-bud nodularity of the left lower lobe, may be seen with small  airway disease.  - Infectious Disease Team contacted  - Check complete abdominal ultrasound  - Check blood cultures and send stool sample if possible      Distended Bladder  Urinalysis (01.30.22 @ 17:58)    Glucose Qualitative, Urine: Negative    Blood, Urine: Negative    pH Urine: 6.0    Color: Light Yellow    Urine Appearance: Clear    Bilirubin: Negative    Ketone - Urine: Small    Specific Gravity: >1.050    Protein, Urine: 30 mg/dL    Urobilinogen: <2 mg/dL    Nitrite: Negative    Leukocyte Esterase Concentration: Small    - Get bladder scan  - Monitor in/out  - Follow urine culture received  - Trend WBC 10.71  - Monitor T afebrile  - Monitor off antibiotics for now      History of cervical cancer   * s/p TAHBSO 1990  - Outpatient follow up as indicated      Asthma  COPD   Tree-In-[place] Nodularity in LLL  * Not on home O2  * On Advair only  * CT Abdomen and Pelvis w/ IV Cont (01.30.22 @ 14:10) Tree-in-bud nodularity of the left lower lobe, may be seen with small  airway disease.  - Monitor SaO2 and O2 requirements on RA  - Duonebs standing and PRN  - Symbicort BID      Status post left total knee replacement  * Stable  - PT consult  - DVT Prophylaxis as below  - No indication for VA duplex venous LE      Others  - DVT Prophylaxis: Lovenox 40mg Subcutaneously daily  - GI Prophylaxis: Pantoprazole 40mg PO QD  - Diet: NPO as above  - Code Status: Full      Barriers to learning: NO  Discharge Planning: Patient will be discharged once stable   Plan was communicated with patient and medical team      Ronna Butts MD  PGY - 2 Internal Medicine   [facility]   Assessment and Plan  Case of an 86 year old female patient with Asthma/COPD and history of cervical cancer s/p TAHBSO who presented to the ED on 01/30 for evaluation of 2 days of intermittent lower quadrant abdominal pain associated with nausea and vomiting, found to have evidence of cholelithiasis with CBD and main PD dilation on imaging, to be admitted for further investigations, management, and monitoring. Currently hemodynamically stable.      Cholelithiasis  Dilated CBD and Main Pancreatic Duct: Rule Out Obstruction (Pancreatic Mass)  Left Hepatic Lobe Hypodensity 1.3cm  * Last colonoscopy many years ago: insignificant per patient  * ED Vitals /87mmHg, , no fever  * ED Labs WBC 10.71, Hb 13.9, Plt 474, T bili 0.3, ALP 96, AST 20, ALT 18, Lipase 22, Troponin 0.02, LA 2.3  * US Abdomen Upper Quadrant Right (01.30.22 @ 13:47)Mildly dilated common bile duct and proximal pancreatic duct. An  obstructing process is not excluded Further evaluation with pancreatic MRI with gadolinium and MRCP is recommended Cholelithiasis.  * CT Abdomen and Pelvis w/ IV Cont (01.30.22 @ 14:10) Cholelithiasis, better visualized on ultrasound from the same day. No CT evidence of cholecystitis. Distended urinary bladder, Septated peripherally calcified centrally hypodense structure noted within the spleen measuring 2.2 x 1.5 cm. Finding is nonspecific, however differential includes parasitic infection. Recommend ultrasound for  further evaluation. Indeterminate left hepatic lobe hypodensity measuring 1.3 cm.  Nonemergent/outpatient MRI hepatic protocol can be obtained for further  evaluation. Tree-in-bud nodularity of the left lower lobe, may be seen with small  airway disease.  * S/P IV 3L LR, Morphine and Zofran in ED    - Gastroenterology Team consulted  - Check MRCP and MR pancreatic protocol to further evaluate pancreatic and CBD ducts and to rule out obstruction from a pancreatic or other mass  - Might need ERCP based on MRCP findings  - Keep patient NPO and start gentle IV fluid hydration. Trend LA 2.3 in ED-> repeat  - Trend WBC 10.71  - Monitor T afebrile  - Trend LFTs daily (not CMP)  - Check hepatic function panel  - Check coagulation profile  - Monitor pain and keep score at 01/10: tylenol and morphine PRN  - Monitor nausea and start IV Zofran 4mg Q8h PRN  - Consider MR hepatic protocol to better evaluate the left hepatic lobe hypodensity  - Start Protonix 40mg QD      Septated Splenic Mass 2.2 x 1.5cm  Rule Out Parasitic Infection  * CT Abdomen and Pelvis w/ IV Cont (01.30.22 @ 14:10) Cholelithiasis, better visualized on ultrasound from the same day. No CT evidence of cholecystitis. Distended urinary bladder, Septated peripherally calcified centrally hypodense structure noted within the spleen measuring 2.2 x 1.5 cm. Finding is nonspecific, however differential includes parasitic infection. Recommend ultrasound for  further evaluation. Indeterminate left hepatic lobe hypodensity measuring 1.3 cm.  Nonemergent/outpatient MRI hepatic protocol can be obtained for further  evaluation. Tree-in-bud nodularity of the left lower lobe, may be seen with small  airway disease.  - Infectious Disease Team contacted  - Check complete abdominal ultrasound  - Check blood cultures and send stool sample if possible      Distended Bladder  Urinalysis (01.30.22 @ 17:58)    Glucose Qualitative, Urine: Negative    Blood, Urine: Negative    pH Urine: 6.0    Color: Light Yellow    Urine Appearance: Clear    Bilirubin: Negative    Ketone - Urine: Small    Specific Gravity: >1.050    Protein, Urine: 30 mg/dL    Urobilinogen: <2 mg/dL    Nitrite: Negative    Leukocyte Esterase Concentration: Small    - Get bladder scan  - Monitor in/out  - Follow urine culture received  - Trend WBC 10.71  - Monitor T afebrile  - Monitor off antibiotics for now      History of cervical cancer   * s/p TAHBSO 1990  - Outpatient follow up as indicated      Asthma  COPD   Tree-In-[place] Nodularity in LLL  * Not on home O2  * On Advair only  * CT Abdomen and Pelvis w/ IV Cont (01.30.22 @ 14:10) Tree-in-bud nodularity of the left lower lobe, may be seen with small  airway disease.  - Monitor SaO2 and O2 requirements on RA  - Duonebs standing and PRN  - Symbicort BID      Status post left total knee replacement  * Stable  - PT consult  - DVT Prophylaxis as below  - Check VA duplex venous LE in setting of possible malignancy      Others  - DVT Prophylaxis: Lovenox 40mg Subcutaneously daily  - GI Prophylaxis: Pantoprazole 40mg PO QD  - Diet: NPO as above  - Code Status: Full      Barriers to learning: NO  Discharge Planning: Patient will be discharged once stable   Plan was communicated with patient and medical team      Ronna Butts MD  PGY - 2 Internal Medicine   [facility]   Assessment and Plan  Case of an 86 year old female patient with Asthma/COPD and history of cervical cancer s/p TAHBSO who presented to the ED on 01/30 for evaluation of 2 days of intermittent lower quadrant abdominal pain associated with nausea and vomiting, found to have evidence of cholelithiasis with CBD and main PD dilation on imaging, to be admitted for further investigations, management, and monitoring. Currently hemodynamically stable.      Cholelithiasis  Dilated CBD and Main Pancreatic Duct: Rule Out Obstruction (Pancreatic Mass)  Left Hepatic Lobe Hypodensity 1.3cm  * Last colonoscopy many years ago: insignificant per patient  * ED Vitals /87mmHg, , no fever  * ED Labs WBC 10.71, Hb 13.9, Plt 474, T bili 0.3, ALP 96, AST 20, ALT 18, Lipase 22, Troponin 0.02, LA 2.3  * US Abdomen Upper Quadrant Right (01.30.22 @ 13:47)Mildly dilated common bile duct and proximal pancreatic duct. An  obstructing process is not excluded Further evaluation with pancreatic MRI with gadolinium and MRCP is recommended Cholelithiasis.  * CT Abdomen and Pelvis w/ IV Cont (01.30.22 @ 14:10) Cholelithiasis, better visualized on ultrasound from the same day. No CT evidence of cholecystitis. Distended urinary bladder, Septated peripherally calcified centrally hypodense structure noted within the spleen measuring 2.2 x 1.5 cm. Finding is nonspecific, however differential includes parasitic infection. Recommend ultrasound for  further evaluation. Indeterminate left hepatic lobe hypodensity measuring 1.3 cm.  Nonemergent/outpatient MRI hepatic protocol can be obtained for further  evaluation. Tree-in-bud nodularity of the left lower lobe, may be seen with small  airway disease.  * S/P IV 3L LR, Morphine and Zofran in ED    - Gastroenterology Team consulted  - Check MRCP and MR pancreatic protocol to further evaluate pancreatic and CBD ducts and to rule out obstruction from a pancreatic or other mass  - Might need ERCP based on MRCP findings  - Keep patient NPO and start gentle IV fluid hydration. Trend LA 2.3 in ED-> repeat  - Trend WBC 10.71  - Monitor T afebrile  - Trend LFTs daily (not CMP)  - Check hepatic function panel  - Check coagulation profile  - Monitor pain and keep score at 01/10: tylenol and morphine PRN  - Monitor nausea and start IV Zofran 4mg Q8h PRN  - Consider MR hepatic protocol to better evaluate the left hepatic lobe hypodensity  - Start Protonix 40mg QD      Septated Splenic Mass 2.2 x 1.5cm  Rule Out Parasitic Infection  * CT Abdomen and Pelvis w/ IV Cont (01.30.22 @ 14:10) Cholelithiasis, better visualized on ultrasound from the same day. No CT evidence of cholecystitis. Distended urinary bladder, Septated peripherally calcified centrally hypodense structure noted within the spleen measuring 2.2 x 1.5 cm. Finding is nonspecific, however differential includes parasitic infection. Recommend ultrasound for  further evaluation. Indeterminate left hepatic lobe hypodensity measuring 1.3 cm.  Nonemergent/outpatient MRI hepatic protocol can be obtained for further  evaluation. Tree-in-bud nodularity of the left lower lobe, may be seen with small  airway disease.  - Infectious Disease Team contacted  - Check complete abdominal ultrasound  - Check blood cultures and send stool sample if possible      Distended Bladder  Urinalysis (01.30.22 @ 17:58)    Glucose Qualitative, Urine: Negative    Blood, Urine: Negative    pH Urine: 6.0    Color: Light Yellow    Urine Appearance: Clear    Bilirubin: Negative    Ketone - Urine: Small    Specific Gravity: >1.050    Protein, Urine: 30 mg/dL    Urobilinogen: <2 mg/dL    Nitrite: Negative    Leukocyte Esterase Concentration: Small    - Get bladder scan  - Monitor in/out  - Follow urine culture received  - Trend WBC 10.71  - Monitor T afebrile  - Monitor off antibiotics for now      Thyroid Nodule  * Noted on prior CT scan 2020  * No TSH in chart  - Check TSH for now  - OP ultrasound      History of cervical cancer   * s/p TAHBSO 1990  - Outpatient follow up as indicated      Asthma  COPD   Tree-In-[place] Nodularity in LLL  * Not on home O2  * On Advair only  * CT Abdomen and Pelvis w/ IV Cont (01.30.22 @ 14:10) Tree-in-bud nodularity of the left lower lobe, may be seen with small  airway disease.  - Monitor SaO2 and O2 requirements on RA  - Duonebs standing and PRN  - Symbicort BID      Status post left total knee replacement  * Stable  - PT consult  - DVT Prophylaxis as below  - Check VA duplex venous LE in setting of possible malignancy      Others  - DVT Prophylaxis: Lovenox 40mg Subcutaneously daily  - GI Prophylaxis: Pantoprazole 40mg PO QD  - Diet: NPO as above  - Code Status: Full      Barriers to learning: NO  Discharge Planning: Patient will be discharged once stable   Plan was communicated with patient and medical team      Ronna Butts MD  PGY - 2 Internal Medicine   [facility]   Assessment and Plan  Case of an 86 year old female patient with Asthma/COPD and history of cervical cancer s/p TAHBSO who presented to the ED on 01/30 for evaluation of 2 days of intermittent lower quadrant abdominal pain associated with nausea and vomiting, found to have evidence of cholelithiasis with CBD and main PD dilation and septated splenic mass on imaging, to be admitted for further investigations, management, and monitoring. Currently hemodynamically stable.      Cholelithiasis  Dilated CBD and Main Pancreatic Duct: Rule Out Obstruction (Pancreatic Mass)  Left Hepatic Lobe Hypodensity 1.3cm  * Last colonoscopy many years ago: insignificant per patient  * ED Vitals /87mmHg, , no fever  * ED Labs WBC 10.71, Hb 13.9, Plt 474, T bili 0.3, ALP 96, AST 20, ALT 18, Lipase 22, Troponin 0.02, LA 2.3  * US Abdomen Upper Quadrant Right (01.30.22 @ 13:47)Mildly dilated common bile duct and proximal pancreatic duct. An  obstructing process is not excluded Further evaluation with pancreatic MRI with gadolinium and MRCP is recommended Cholelithiasis.  * CT Abdomen and Pelvis w/ IV Cont (01.30.22 @ 14:10) Cholelithiasis, better visualized on ultrasound from the same day. No CT evidence of cholecystitis. Distended urinary bladder, Septated peripherally calcified centrally hypodense structure noted within the spleen measuring 2.2 x 1.5 cm. Finding is nonspecific, however differential includes parasitic infection. Recommend ultrasound for  further evaluation. Indeterminate left hepatic lobe hypodensity measuring 1.3 cm.  Nonemergent/outpatient MRI hepatic protocol can be obtained for further  evaluation. Tree-in-bud nodularity of the left lower lobe, may be seen with small  airway disease.  * S/P IV 3L LR, Morphine and Zofran in ED    - Gastroenterology Team consulted  - Check MRCP and MR pancreatic protocol to further evaluate pancreatic and CBD ducts and to rule out obstruction from a pancreatic or other mass  - Might need ERCP based on MRCP findings  - Keep patient NPO and start gentle IV fluid hydration. Trend LA 2.3 in ED-> repeat  - Trend WBC 10.71  - Monitor T afebrile  - Trend LFTs daily (not CMP)  - Check hepatic function panel  - Check coagulation profile  - Monitor pain and keep score at 01/10: tylenol and morphine PRN  - Monitor nausea and start IV Zofran 4mg Q8h PRN  - Consider MR hepatic protocol to better evaluate the left hepatic lobe hypodensity  - Start Protonix 40mg QD      Septated Splenic Mass 2.2 x 1.5cm  Rule Out Parasitic Infection  * CT Abdomen and Pelvis w/ IV Cont (01.30.22 @ 14:10) Cholelithiasis, better visualized on ultrasound from the same day. No CT evidence of cholecystitis. Distended urinary bladder, Septated peripherally calcified centrally hypodense structure noted within the spleen measuring 2.2 x 1.5 cm. Finding is nonspecific, however differential includes parasitic infection. Recommend ultrasound for  further evaluation. Indeterminate left hepatic lobe hypodensity measuring 1.3 cm.  Nonemergent/outpatient MRI hepatic protocol can be obtained for further  evaluation. Tree-in-bud nodularity of the left lower lobe, may be seen with small  airway disease.  - Infectious Disease Team contacted  - Check complete abdominal ultrasound  - Check blood cultures and send stool sample if possible      Distended Bladder  Urinalysis (01.30.22 @ 17:58)    Glucose Qualitative, Urine: Negative    Blood, Urine: Negative    pH Urine: 6.0    Color: Light Yellow    Urine Appearance: Clear    Bilirubin: Negative    Ketone - Urine: Small    Specific Gravity: >1.050    Protein, Urine: 30 mg/dL    Urobilinogen: <2 mg/dL    Nitrite: Negative    Leukocyte Esterase Concentration: Small    - Get bladder scan  - Monitor in/out  - Follow urine culture received  - Trend WBC 10.71  - Monitor T afebrile  - Monitor off antibiotics for now      Thyroid Nodule  * Noted on prior CT scan 2020  * No TSH in chart  - Check TSH for now  - OP ultrasound      History of cervical cancer   * s/p TAHBSO 1990  - Outpatient follow up as indicated      Asthma  COPD   Tree-In-[place] Nodularity in LLL  * Not on home O2  * On Advair only  * CT Abdomen and Pelvis w/ IV Cont (01.30.22 @ 14:10) Tree-in-bud nodularity of the left lower lobe, may be seen with small  airway disease.  - Monitor SaO2 and O2 requirements on RA  - Duonebs standing and PRN  - Symbicort BID      Status post left total knee replacement  * Stable  - PT consult  - DVT Prophylaxis as below  - Check VA duplex venous LE in setting of possible malignancy      Others  - DVT Prophylaxis: Lovenox 40mg Subcutaneously daily  - GI Prophylaxis: Pantoprazole 40mg PO QD  - Diet: NPO as above  - Code Status: Full      Barriers to learning: NO  Discharge Planning: Patient will be discharged once stable   Plan was communicated with patient and medical team      Ronna Butts MD  PGY - 2 Internal Medicine   [facility]   Assessment and Plan  Case of an 86 year old female patient with Asthma/COPD and history of cervical cancer s/p TAHBSO who presented to the ED on 01/30 for evaluation of 2 days of intermittent lower quadrant abdominal pain associated with nausea and vomiting, found to have evidence of cholelithiasis with CBD and main PD dilation and septated splenic mass on imaging, to be admitted for further investigations, management, and monitoring. Currently hemodynamically stable.      Cholelithiasis  Dilated CBD and Main Pancreatic Duct: Rule Out Obstruction (Pancreatic Mass)  Left Hepatic Lobe Hypodensity 1.3cm  * Last colonoscopy many years ago: insignificant per patient  * ED Vitals /87mmHg, , no fever  * ED Labs WBC 10.71, Hb 13.9, Plt 474, T bili 0.3, ALP 96, AST 20, ALT 18, Lipase 22, Troponin 0.02, LA 2.3  * US Abdomen Upper Quadrant Right (01.30.22 @ 13:47)Mildly dilated common bile duct and proximal pancreatic duct. An  obstructing process is not excluded Further evaluation with pancreatic MRI with gadolinium and MRCP is recommended Cholelithiasis.  * CT Abdomen and Pelvis w/ IV Cont (01.30.22 @ 14:10) Cholelithiasis, better visualized on ultrasound from the same day. No CT evidence of cholecystitis. Distended urinary bladder, Septated peripherally calcified centrally hypodense structure noted within the spleen measuring 2.2 x 1.5 cm. Finding is nonspecific, however differential includes parasitic infection. Recommend ultrasound for  further evaluation. Indeterminate left hepatic lobe hypodensity measuring 1.3 cm.  Nonemergent/outpatient MRI hepatic protocol can be obtained for further  evaluation. Tree-in-bud nodularity of the left lower lobe, may be seen with small  airway disease.  * S/P IV 3L LR, Morphine and Zofran in ED    - Gastroenterology Team consulted  - Check MRCP and MR pancreatic protocol to further evaluate pancreatic and CBD ducts and to rule out obstruction from a pancreatic or other mass  - Might need ERCP based on MRCP findings  - Keep patient NPO and start gentle IV fluid hydration. Trend LA 2.3 in ED-> repeat  - Trend WBC 10.71  - Monitor T afebrile  - Trend LFTs daily (not CMP)  - Check hepatic function panel  - Check coagulation profile  - Monitor pain and keep score at 01/10: tylenol and morphine PRN  - Monitor nausea and start IV Zofran 4mg Q8h PRN  - Consider MR hepatic protocol to better evaluate the left hepatic lobe hypodensity  - Start Protonix 40mg QD      Septated Splenic Mass 2.2 x 1.5cm  Rule Out Parasitic Infection Hydatid Versus Mets  * CT Abdomen and Pelvis w/ IV Cont (01.30.22 @ 14:10) Cholelithiasis, better visualized on ultrasound from the same day. No CT evidence of cholecystitis. Distended urinary bladder, Septated peripherally calcified centrally hypodense structure noted within the spleen measuring 2.2 x 1.5 cm. Finding is nonspecific, however differential includes parasitic infection. Recommend ultrasound for  further evaluation. Indeterminate left hepatic lobe hypodensity measuring 1.3 cm.  Nonemergent/outpatient MRI hepatic protocol can be obtained for further  evaluation. Tree-in-bud nodularity of the left lower lobe, may be seen with small  airway disease.  - Infectious Disease Team contacted  - Check complete abdominal ultrasound  - Check blood cultures and send stool sample if possible      Distended Bladder  Urinalysis (01.30.22 @ 17:58)    Glucose Qualitative, Urine: Negative    Blood, Urine: Negative    pH Urine: 6.0    Color: Light Yellow    Urine Appearance: Clear    Bilirubin: Negative    Ketone - Urine: Small    Specific Gravity: >1.050    Protein, Urine: 30 mg/dL    Urobilinogen: <2 mg/dL    Nitrite: Negative    Leukocyte Esterase Concentration: Small    - Get bladder scan  - Monitor in/out  - Follow urine culture received  - Trend WBC 10.71  - Monitor T afebrile  - Monitor off antibiotics for now      Thyroid Nodule  * Noted on prior CT scan 2020  * No TSH in chart  - Check TSH for now  - OP ultrasound      History of cervical cancer   * s/p TAHBSO 1990  - Outpatient follow up as indicated      Asthma  COPD   Tree-In-[place] Nodularity in LLL  * Not on home O2  * On Advair only  * CT Abdomen and Pelvis w/ IV Cont (01.30.22 @ 14:10) Tree-in-bud nodularity of the left lower lobe, may be seen with small  airway disease.  - Monitor SaO2 and O2 requirements on RA  - Duonebs standing and PRN  - Symbicort BID      Status post left total knee replacement  * Stable  - PT consult  - DVT Prophylaxis as below  - Check VA duplex venous LE in setting of possible malignancy      Others  - DVT Prophylaxis: Lovenox 40mg Subcutaneously daily  - GI Prophylaxis: Pantoprazole 40mg PO QD  - Diet: NPO as above  - Code Status: Full      Barriers to learning: NO  Discharge Planning: Patient will be discharged once stable   Plan was communicated with patient and medical team      Ronna Butts MD  PGY - 2 Internal Medicine   [facility]   Assessment and Plan  Case of an 86 year old female patient with Asthma/COPD and history of cervical cancer s/p TAHBSO who presented to the ED on 01/30 for evaluation of 2 days of intermittent lower quadrant abdominal pain associated with nausea and vomiting, found to have evidence of cholelithiasis with CBD and main PD dilation and septated splenic mass on imaging, to be admitted for further investigations, management, and monitoring. Currently hemodynamically stable.      Cholelithiasis  Dilated CBD and Main Pancreatic Duct: Rule Out Obstruction (Pancreatic Mass)  Left Hepatic Lobe Hypodensity 1.3cm  * Last colonoscopy many years ago: insignificant per patient  * ED Vitals /87mmHg, , no fever  * ED Labs WBC 10.71, Hb 13.9, Plt 474, T bili 0.3, ALP 96, AST 20, ALT 18, Lipase 22, Troponin 0.02, LA 2.3  * US Abdomen Upper Quadrant Right (01.30.22 @ 13:47)Mildly dilated common bile duct and proximal pancreatic duct. An  obstructing process is not excluded Further evaluation with pancreatic MRI with gadolinium and MRCP is recommended Cholelithiasis.  * CT Abdomen and Pelvis w/ IV Cont (01.30.22 @ 14:10) Cholelithiasis, better visualized on ultrasound from the same day. No CT evidence of cholecystitis. Distended urinary bladder, Septated peripherally calcified centrally hypodense structure noted within the spleen measuring 2.2 x 1.5 cm. Finding is nonspecific, however differential includes parasitic infection. Recommend ultrasound for  further evaluation. Indeterminate left hepatic lobe hypodensity measuring 1.3 cm.  Nonemergent/outpatient MRI hepatic protocol can be obtained for further  evaluation. Tree-in-bud nodularity of the left lower lobe, may be seen with small  airway disease.  * S/P IV 3L LR, Morphine and Zofran in ED    - Gastroenterology Team consulted  - Check MRCP and MR pancreatic protocol to further evaluate pancreatic and CBD ducts and to rule out obstruction from a pancreatic or other mass  - Might need ERCP based on MRCP findings  - Keep patient NPO and start gentle IV fluid hydration. Trend LA 2.3 in ED-> repeat  - Trend WBC 10.71  - Monitor T afebrile  - Trend LFTs daily (not CMP). 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 No CT evidence of cholecystitis. Distended urinary bladder, Septated peripherally calcified centrally hypodense structure noted within the spleen measuring 2.2 x 1.5 cm. Finding is nonspecific, however differential includes parasitic infection. Recommend ultrasound for  further evaluation. Indeterminate left hepatic lobe hypodensity measuring 1.3 cm.  Nonemergent/outpatient MRI hepatic protocol can be obtained for further  evaluation. Tree-in-bud nodularity of the left lower lobe, may be seen with small  airway disease.  - Infectious Disease Team contacted  - Check complete abdominal ultrasound  - Check blood cultures and send stool sample if possible      Distended Bladder  Urinalysis (01.30.22 @ 17:58)    Glucose Qualitative, Urine: Negative    Blood, Urine: Negative    pH Urine: 6.0    Color: Light Yellow    Urine Appearance: Clear    Bilirubin: Negative    Ketone - Urine: Small    Specific Gravity: >1.050    Protein, Urine: 30 mg/dL    Urobilinogen: <2 mg/dL    Nitrite: Negative    Leukocyte Esterase Concentration: Small    - Get bladder scan  - Monitor in/out  - Follow urine culture received  - Trend WBC 10.71  - Monitor T afebrile  - Monitor off antibiotics for now      Thyroid Nodule  * Noted on prior CT scan 2020  * No TSH in chart  - Check TSH for now  - OP ultrasound      History of cervical cancer   * s/p TAHBSO 1990  - Outpatient follow up as indicated      Asthma  COPD   Tree-In-[place] Nodularity in LLL  * Not on home O2  * On Advair only  * CT Abdomen and Pelvis w/ IV Cont (01.30.22 @ 14:10) Tree-in-bud nodularity of the left lower lobe, may be seen with small  airway disease.  - Monitor SaO2 and O2 requirements on RA  - Duonebs standing and PRN  - Symbicort BID      Status post left total knee replacement  * Stable  - PT consult  - DVT Prophylaxis as below  - Check VA duplex venous LE in setting of possible malignancy      Others  - DVT Prophylaxis: Lovenox 40mg Subcutaneously daily  - GI Prophylaxis: Pantoprazole 40mg PO QD  - Diet: NPO as above  - Code Status: Full      Barriers to learning: NO  Discharge Planning: Patient will be discharged once stable   Plan was communicated with patient and medical team      Ronna Butts MD  PGY - 2 Internal Medicine   [facility]

## 2022-01-30 NOTE — H&P ADULT - HISTORY OF PRESENT ILLNESS
History of Present Illness    Ms. Graves is an 86 year old (non smoker) female patient known to have:  - Baseline AO3, ambulates independently  - History of cervical cancer s/p TAHBSO 1990  - Asthma/ COPD not on home O2. On Advair only  - Status post left total knee replacement      She presented to the ED on 01/30 for evaluation of abdominal pain.  History goes back to 2 days PTP when the patient started complaining of intermittent episodes of lower quadrant abdominal pain that is sharp in nature, of 8/10 in intensity at its peak, and non radiating.  It is exacerbated with PO intake with no notable relieving factors.  It has been associated with nausea, episodes of non bilious non projectile vomiting, and reduced PO intake x2 days.  Patient otherwise denies any skin jaundice/ yellowish discoloration or change in bowel movements.      On review of systems, patient denies any recent fever, chills, night sweats, URTI symptoms (cough, rhinorrhea, sore throat), urinary symptoms (urinary frequency, urgency, intermittence, dysuria, foul smelling urine, cloudy urine), or headache.   No sick contacts.   No recent travel or exposure to recent travelers.      Upon presentation to the ED, the patient was hemodynamically stable:  Vital Signs in ED   - /87 mmHg  -  bpm  - T 36.6  - SaO2 99% on RA      Investigations   Laboratory Workup  - CBC:                        13.9   10.71 )-----------( 474      ( 30 Jan 2022 12:00 )             41.2     - Chemistry:  01-30    136  |  96<L>  |  25<H>  ----------------------------<  119<H>  4.1   |  25  |  0.9    Ca    10.5<H>      30 Jan 2022 12:00    TPro  8.2<H>  /  Alb  5.4<H>  /  TBili  0.3  /  DBili  x   /  AST  20  /  ALT  18  /  AlkPhos  96  01-30      - Cardiac Markers:  CARDIAC MARKERS ( 30 Jan 2022 12:00 )  x     / 0.02 ng/mL / x     / x     / x            Microbiological Workup  Urinalysis Basic - ( 30 Jan 2022 17:58 )    Color: Light Yellow / Appearance: Clear / SG: >1.050 / pH: x  Gluc: x / Ketone: Small  / Bili: Negative / Urobili: <2 mg/dL   Blood: x / Protein: 30 mg/dL / Nitrite: Negative   Leuk Esterase: Small / RBC: 2 /HPF / WBC 15 /HPF   Sq Epi: x / Non Sq Epi: 3 /HPF / Bacteria: Negative      Radiological Workup  * US Abdomen Upper Quadrant Right (01.30.22 @ 13:47)Mildly dilated common bile duct and proximal pancreatic duct. An  obstructing process is not excluded Further evaluation with pancreatic MRI with gadolinium and MRCP is recommended Cholelithiasis.  * CT Abdomen and Pelvis w/ IV Cont (01.30.22 @ 14:10) Cholelithiasis, better visualized on ultrasound from the same day. No CT evidence of cholecystitis. Distended urinary bladder, Septated peripherally calcified centrally hypodense structure noted within the spleen measuring 2.2 x 1.5 cm. Finding is nonspecific, however differential includes parasitic infection. Recommend ultrasound for  further evaluation. Indeterminate left hepatic lobe hypodensity measuring 1.3 cm.  Nonemergent/outpatient MRI hepatic protocol can be obtained for further  evaluation. Tree-in-bud nodularity of the left lower lobe, may be seen with small  airway disease.      - Patient received IV Rocephin 1g and 3L LR bolus in ED  - She also received IV Morphine and Zofran in ED  - She will be admitted for further investigations, management, and monitoring

## 2022-01-30 NOTE — H&P ADULT - NSHPPHYSICALEXAM_GEN_ALL_CORE
- Physical Exam in ED  * General Appearance: Alert, cooperative, interactive, oriented to time, place, and person, in no acute distress  * Head: Normocephalic, without obvious abnormality, atraumatic  * Eyes: PERRL, conjunctiva/corneas clear, EOM's intact, fundi benign, both eyes  * Neck: Supple, symmetrical, trachea midline, no adenopathy;   * Thyroid:  No enlargement/tenderness/nodules; no carotid bruit or JVD  * Lungs: Respirations unlabored, Good bilateral air entry, normal breath sounds (Clear to auscultation bilaterally, no audible wheezes, crackles, or rhonchi)  * Heart: Regular Rate and Rhythm, normal S1 and S2, no audible murmur, rub, or gallop  * Abdomen: Symmetric, non-distended, soft, non-tender, bowel sounds active all four quadrants, no masses, no organomegaly (no hepatosplenomegaly)  * Extremities: Extremities normal, atraumatic, no cyanosis, no lower extremity pitting edema bilaterally, adequate dorsalis pedis pulses  * Pulses: 2+ and symmetric all extremities  * Skin: Skin color, texture, turgor normal, no rashes or lesions  * Lymph nodes: Cervical, supraclavicular, and axillary nodes normal  * Neurologic: CNII-XII intact, normal strength, sensation and reflexes throughout

## 2022-01-30 NOTE — ED PROVIDER NOTE - CLINICAL SUMMARY MEDICAL DECISION MAKING FREE TEXT BOX
I personally evaluated the patient. I reviewed the Resident´s or Physician Assistant´s note (as assigned above), and agree with the findings and plan except as documented in my note.  Patient evaluated for epigastric pain.  Labs, EKG, chest x-ray, CT abdomen pelvis, right upper quadrant sono performed in the ED.  Patient with significant amounts of pain and noted to have dilated CBD.  GI consulted.  Patient also noted to have elevated troponin, no chest pain, EKG with no ischemic changes.  Started on antibiotics.  Patient admitted to telemetry for further evaluation and treatment.

## 2022-01-30 NOTE — ED PROVIDER NOTE - CARE PLAN
1 Principal Discharge DX:	Dilated cbd, acquired  Secondary Diagnosis:	Elevated troponin  Secondary Diagnosis:	Acute UTI

## 2022-01-30 NOTE — H&P ADULT - ATTENDING COMMENTS
HPI:  87 y/o woman with a past medical history of Asthma/COPD not on home O2 admitted for abdominal pain and CBD dilation. She notes intermittent abdominal pain since Friday evening without no aggravating or alleviating factors. Her pain was predominantly epigastric and in the b/l upper quadrants and associated with nausea and non-bloody vomiting, as well as poor appetites. No GERD/heartburn, constipation, fevers, chills, CP, palpitations, SOB,.   Currently her abdominal pain is resolved, tolerated food earlier, and have no complaints.  CT A/P and RUQ sono concerning for CBD and pancreatic ductal dilation. Dilated bladder also noted (she notes some hesitancy urinating but no major issues, she urinated well prior to my interview)  Trops elevated with ALIYA but no cardiac symptoms    REVIEW OF SYSTEMS:  CONSTITUTIONAL:  No weakness, fevers, chills, night sweats, weight loss  EYES/ENT: No visual changes. No vertigo or dysphagia  NECK: No neck pain or stiffness  RESPIRATORY: No cough, wheezing, hemoptysis. No shortness of breath  CARDIOVASCULAR: No chest pain or palpitations. No lower extremity edema  GASTROINTESTINAL: + abdominal pain, nausea, vomiting, no diarrhea, or hematemesis  GENITOURINARY: No dysuria or hematuria   NEUROLOGICAL: No focal numbness or weakness  SKIN: No rashes or itching  HEMATOLOGIC: No easy bruising or prolonged bleeding.      PHYSICAL EXAM:  GENERAL: NAD, thin , Non-toxic, elderly and frail stated age   HEAD:  Atraumatic, Normocephalic  EYES: EOMI, Sclera White   NECK: Supple, No JVD  CHEST/LUNG: Clear to auscultation bilaterally; No wheezing, rhonchi, or crackles  HEART: Regular rate and rhythm; s1, s2, No murmurs, rubs, or gallops  ABDOMEN: Soft, Nontender, Nondistended; Bowel sounds present, No rebound or guarding noted   EXTREMITIES:  No lower extremity edema or calf tenderness to palpation.  No clubbing or cyanosis  PSYCH: AAOx3, pleasant, cooperative, not anxious  NEUROLOGY: 5/5 strength in all extremities, no downward drift. Sensation grossly intact.   SKIN: No rashes or lesions      ASSESSMENT AND PLAN:  CBD + Pancreatic duct dilation --> associated with abdo pain, and n/v (?biliary cholic or passed stone)  -Check MRCP to rule out obstructing stone (labs and clinical symptoms are benign)  -Trend LFT and lipase if LFTs are abnormal or abdo pain returns   -GI eval     ALIYA: suspected pre-renal  ?Urinary retention --> doubt its significant though she notes some hesitancy.    Pyuria --> asymptomatic, no abx for now      -Cont IV Fluids   -Check renal bladder sono with post void residual volume.   -Trend Cr, monitor strict I/O  -If worsening then check Urine Na, Cl, urea, urine Pr:Cr, and may need a Nephrology consult.   -Avoid nephrotoxic medications.     Hepatic hypodensity 1.3cm --> needs dedicated outpatient MRI w/ hepatic procol for further evaluation   Splenic hypodensity 2.2 cm --> paracytic infection is on differential though clinically doubt it      -eosinophils are normal, no fevers, significant travel      -Can check US, if confirms suspicion for parasitic collection then would consult ID.     Thyroid nodule: check TSH and out patient US     Elevated trops: no overt cardiac symptoms, down trending.   -Check AM EKG, if no major changes then no further work up     Asthma: cont with symbicort     DVT ppx: Lovenox/Heparin  GI ppx: Not indicated  GOC: Full code.    My note supersedes the residents in the event of a discrepancy.

## 2022-01-30 NOTE — ED PROVIDER NOTE - ATTENDING CONTRIBUTION TO CARE
86-year-old female past medical history of asthma, COPD not on home O2, KAREL/BSO presents with abdominal pain.  Intermittent epigastric abdominal pain for the past few weeks, constant since this morning.  Sharp, radiating to bilateral upper quadrants.  Worse with p.o. intake and associated with nausea and several episodes of nonbloody nonbilious vomiting.  No fever/chills, no chest pain, no shortness of breath, no diarrhea, no flank pain, no urinary symptoms, no palpitations, no headache, no dizziness, no lightheadedness.    CONSTITUTIONAL: Well-developed; well-nourished; in no acute distress. Sitting up and providing appropriate history and physical examination  SKIN: skin exam is warm and dry, no acute rash.  HEAD: Normocephalic; atraumatic.  EYES: PERRL, 3 mm bilateral, no nystagmus, EOM intact; conjunctiva and sclera clear.  ENT: No nasal discharge; airway clear.  NECK: Supple; non tender. + full passive ROM in all directions. No JVD  CARD: S1, S2 normal; no murmurs, gallops, or rubs. Regular rate and rhythm. + Symmetric Strong Pulses  RESP: No wheezes, rales or rhonchi. Good air movement bilaterally  ABD: + Tenderness to palpation over epigastrium and bilateral upper quadrants.  + Lopez sign.  Soft; non-distended. No Rebound, No Guarding, No signs of peritonitis, No CVA tenderness. No pulsatile abdominal mass. + Strong and Symmetric Pulses  EXT: Normal ROM. No clubbing, cyanosis or edema. Dp and Pt Pulses intact. Cap refill less than 3 seconds  NEURO: CN 2-12 intact, normal finger to nose, normal romberg, stable gait, no sensory or motor deficits, Alert, oriented, grossly unremarkable. No Focal deficits. GCS 15. NIH 0  PSYCH: Cooperative, appropriate.

## 2022-01-30 NOTE — ED PROVIDER NOTE - OBJECTIVE STATEMENT
87 yo F with PMH of asthma/COPD not on home O2, [facility] BSO 1990 presenting for 2 days of crampy, intermittent epigastric abdominal pain, constant since this morning and now sharp and radiating to b/l upper quadrants, associated with decreased PO intake, nausea, and NBNB vomiting. Patient denies diarrhea, dysuria, hematuria, melena, hematochezia, new foods/medications, fever, recent travel/sick contacts, CP, SOB. Took tramadol last night, which helped initially and pain came back. 87 yo F with PMH of asthma/COPD not on home O2, [facility] BSO 1990 presenting for 2 days of crampy, intermittent epigastric abdominal pain, constant since this morning and now sharp and radiating to b/l upper quadrants, worse with PO intake and associated with decreased PO intake, nausea, and NBNB vomiting. Patient denies diarrhea, dysuria, hematuria, melena, hematochezia, new foods/medications, fever, recent travel/sick contacts, CP, SOB. Took tramadol last night, which helped initially and pain came back.

## 2022-01-31 VITALS
DIASTOLIC BLOOD PRESSURE: 74 MMHG | OXYGEN SATURATION: 97 % | TEMPERATURE: 98 F | RESPIRATION RATE: 18 BRPM | HEART RATE: 85 BPM | SYSTOLIC BLOOD PRESSURE: 172 MMHG

## 2022-01-31 LAB
A1C WITH ESTIMATED AVERAGE GLUCOSE RESULT: 5.7 % — HIGH (ref 4–5.6)
ALBUMIN SERPL ELPH-MCNC: 4 G/DL — SIGNIFICANT CHANGE UP (ref 3.5–5.2)
ALP SERPL-CCNC: 68 U/L — SIGNIFICANT CHANGE UP (ref 30–115)
ALT FLD-CCNC: 14 U/L — SIGNIFICANT CHANGE UP (ref 0–41)
ANION GAP SERPL CALC-SCNC: 9 MMOL/L — SIGNIFICANT CHANGE UP (ref 7–14)
APTT BLD: 30.7 SEC — SIGNIFICANT CHANGE UP (ref 27–39.2)
AST SERPL-CCNC: 19 U/L — SIGNIFICANT CHANGE UP (ref 0–41)
BASOPHILS # BLD AUTO: 0.06 K/UL — SIGNIFICANT CHANGE UP (ref 0–0.2)
BASOPHILS NFR BLD AUTO: 0.7 % — SIGNIFICANT CHANGE UP (ref 0–1)
BILIRUB SERPL-MCNC: 0.2 MG/DL — SIGNIFICANT CHANGE UP (ref 0.2–1.2)
BUN SERPL-MCNC: 16 MG/DL — SIGNIFICANT CHANGE UP (ref 10–20)
CALCIUM SERPL-MCNC: 9.1 MG/DL — SIGNIFICANT CHANGE UP (ref 8.5–10.1)
CHLORIDE SERPL-SCNC: 102 MMOL/L — SIGNIFICANT CHANGE UP (ref 98–110)
CHOLEST SERPL-MCNC: 195 MG/DL — SIGNIFICANT CHANGE UP
CO2 SERPL-SCNC: 29 MMOL/L — SIGNIFICANT CHANGE UP (ref 17–32)
CREAT SERPL-MCNC: 0.6 MG/DL — LOW (ref 0.7–1.5)
EOSINOPHIL # BLD AUTO: 0.07 K/UL — SIGNIFICANT CHANGE UP (ref 0–0.7)
EOSINOPHIL NFR BLD AUTO: 0.8 % — SIGNIFICANT CHANGE UP (ref 0–8)
ESTIMATED AVERAGE GLUCOSE: 117 MG/DL — HIGH (ref 68–114)
GGT SERPL-CCNC: 12 U/L — SIGNIFICANT CHANGE UP (ref 1–40)
GLUCOSE SERPL-MCNC: 99 MG/DL — SIGNIFICANT CHANGE UP (ref 70–99)
HCT VFR BLD CALC: 33.4 % — LOW (ref 37–47)
HDLC SERPL-MCNC: 59 MG/DL — SIGNIFICANT CHANGE UP
HGB BLD-MCNC: 11.3 G/DL — LOW (ref 12–16)
IMM GRANULOCYTES NFR BLD AUTO: 0.5 % — HIGH (ref 0.1–0.3)
INR BLD: 1.06 RATIO — SIGNIFICANT CHANGE UP (ref 0.65–1.3)
LACTATE SERPL-SCNC: 0.9 MMOL/L — SIGNIFICANT CHANGE UP (ref 0.7–2)
LIPID PNL WITH DIRECT LDL SERPL: 119 MG/DL — HIGH
LYMPHOCYTES # BLD AUTO: 1.4 K/UL — SIGNIFICANT CHANGE UP (ref 1.2–3.4)
LYMPHOCYTES # BLD AUTO: 16.5 % — LOW (ref 20.5–51.1)
MAGNESIUM SERPL-MCNC: 1.6 MG/DL — LOW (ref 1.8–2.4)
MCHC RBC-ENTMCNC: 30.3 PG — SIGNIFICANT CHANGE UP (ref 27–31)
MCHC RBC-ENTMCNC: 33.8 G/DL — SIGNIFICANT CHANGE UP (ref 32–37)
MCV RBC AUTO: 89.5 FL — SIGNIFICANT CHANGE UP (ref 81–99)
MONOCYTES # BLD AUTO: 0.7 K/UL — HIGH (ref 0.1–0.6)
MONOCYTES NFR BLD AUTO: 8.2 % — SIGNIFICANT CHANGE UP (ref 1.7–9.3)
NEUTROPHILS # BLD AUTO: 6.23 K/UL — SIGNIFICANT CHANGE UP (ref 1.4–6.5)
NEUTROPHILS NFR BLD AUTO: 73.3 % — SIGNIFICANT CHANGE UP (ref 42.2–75.2)
NON HDL CHOLESTEROL: 136 MG/DL — HIGH
NRBC # BLD: 0 /100 WBCS — SIGNIFICANT CHANGE UP (ref 0–0)
PHOSPHATE SERPL-MCNC: 2.9 MG/DL — SIGNIFICANT CHANGE UP (ref 2.1–4.9)
PLATELET # BLD AUTO: 388 K/UL — SIGNIFICANT CHANGE UP (ref 130–400)
POTASSIUM SERPL-MCNC: 4.2 MMOL/L — SIGNIFICANT CHANGE UP (ref 3.5–5)
POTASSIUM SERPL-SCNC: 4.2 MMOL/L — SIGNIFICANT CHANGE UP (ref 3.5–5)
PROT SERPL-MCNC: 5.9 G/DL — LOW (ref 6–8)
PROTHROM AB SERPL-ACNC: 12.2 SEC — SIGNIFICANT CHANGE UP (ref 9.95–12.87)
RBC # BLD: 3.73 M/UL — LOW (ref 4.2–5.4)
RBC # FLD: 15.1 % — HIGH (ref 11.5–14.5)
SODIUM SERPL-SCNC: 140 MMOL/L — SIGNIFICANT CHANGE UP (ref 135–146)
TRIGL SERPL-MCNC: 78 MG/DL — SIGNIFICANT CHANGE UP
TROPONIN T SERPL-MCNC: <0.01 NG/ML — SIGNIFICANT CHANGE UP
TSH SERPL-MCNC: 1.63 UIU/ML — SIGNIFICANT CHANGE UP (ref 0.27–4.2)
WBC # BLD: 8.5 K/UL — SIGNIFICANT CHANGE UP (ref 4.8–10.8)
WBC # FLD AUTO: 8.5 K/UL — SIGNIFICANT CHANGE UP (ref 4.8–10.8)

## 2022-01-31 PROCEDURE — 76705 ECHO EXAM OF ABDOMEN: CPT | Mod: 26

## 2022-01-31 PROCEDURE — 99233 SBSQ HOSP IP/OBS HIGH 50: CPT

## 2022-01-31 PROCEDURE — 99221 1ST HOSP IP/OBS SF/LOW 40: CPT

## 2022-01-31 PROCEDURE — 93970 EXTREMITY STUDY: CPT | Mod: 26

## 2022-01-31 PROCEDURE — 93010 ELECTROCARDIOGRAM REPORT: CPT

## 2022-01-31 PROCEDURE — 74183 MRI ABD W/O CNTR FLWD CNTR: CPT | Mod: 26

## 2022-01-31 PROCEDURE — 76770 US EXAM ABDO BACK WALL COMP: CPT | Mod: 26

## 2022-01-31 RX ORDER — SENNA PLUS 8.6 MG/1
2 TABLET ORAL DAILY
Refills: 0 | Status: DISCONTINUED | OUTPATIENT
Start: 2022-01-31 | End: 2022-01-31

## 2022-01-31 RX ADMIN — SENNA PLUS 2 TABLET(S): 8.6 TABLET ORAL at 13:38

## 2022-01-31 RX ADMIN — ENOXAPARIN SODIUM 40 MILLIGRAM(S): 100 INJECTION SUBCUTANEOUS at 13:38

## 2022-01-31 RX ADMIN — Medication 650 MILLIGRAM(S): at 10:41

## 2022-01-31 RX ADMIN — PANTOPRAZOLE SODIUM 40 MILLIGRAM(S): 20 TABLET, DELAYED RELEASE ORAL at 10:41

## 2022-01-31 RX ADMIN — SODIUM CHLORIDE 100 MILLILITER(S): 9 INJECTION, SOLUTION INTRAVENOUS at 13:38

## 2022-01-31 NOTE — CONSULT NOTE ADULT - ASSESSMENT
Patient is a 87 y/o female with PMHx of COPD who was BIBEMS from home after developing abdominal pain. Patient has been in her USOH until around 48 hours prior to hospital presentation. She notes she developed upper abdominal pain, both right and left sided, which radiated down towards the lower abdomen. She was found to have CBD and PD dilation, cholelithiasis ( No cholecystitis), as well as hypodense structure within the spleen, and Hepatic dome hypodensity. Patient non tender on exam, has stable hemodynamics, normal WBC, normal LFT, normal Lipase and resolution of her symptoms. Agree with MRI ( Would do with and without contrast Liver protocol-with MRCP cuts). Patient wants to eat and go home. I spoke with her that it would be important to investigate further the findings that she has on imaging. Likely her findings are chronic and not acute but underlying malignancy remains in the differential. She again was noting if the MRI is not done she may want to leave. She again was made the importance of ruling out a malignancy.     CBD and PD dilation/ Splenic hypodensity/ Liver Hypodensity   - LFT, WBC, Temperature, and physical exam unremarkable  - Agree with MRI of abdomen with and without contrast to include MRCP views  - Patient may want to leave if MRI not done soon, I advised her against it as we need to rule out possibility of malignancy  - If she decides to leave she is aware she needs to follow up with her PMD as well as GI [facility] clinic [phone] as this needs to be evaluated

## 2022-01-31 NOTE — CONSULT NOTE ADULT - ATTENDING COMMENTS
Patient seen and examined during the GI[facility] rounds, case discussed on rounds, assessment and plan written as above

## 2022-01-31 NOTE — PROGRESS NOTE ADULT - SUBJECTIVE AND OBJECTIVE BOX
HPI  Patient is a 86y old Female who presents with a chief complaint of Dilated CBD and main PD (31 Jan 2022 10:24)    Currently admitted to medicine with the primary diagnosis of Dilated cbd, acquired       Today is hospital day 1d.     INTERVAL HPI / OVERNIGHT EVENTS:  Patient was seen and examined at bedside  Patient Feels better, Feels hungry and wants to go home  Denies any complains of chest pain or shortness of breath  Denies any abdominal pain/nausea/vomiting        PAST MEDICAL & SURGICAL HISTORY  COPD (chronic obstructive pulmonary disease)      ALLERGIES  Allergy Status Unknown    MEDICATIONS  STANDING MEDICATIONS  chlorhexidine 4% Liquid 1 Application(s) Topical <User Schedule>  enoxaparin Injectable 40 milliGRAM(s) SubCutaneous daily  lactated ringers. 1000 milliLiter(s) IV Continuous <Continuous>  lactated ringers. 1000 milliLiter(s) IV Continuous <Continuous>  pantoprazole    Tablet 40 milliGRAM(s) Oral before breakfast  senna 2 Tablet(s) Oral daily    PRN MEDICATIONS  acetaminophen     Tablet .. 650 milliGRAM(s) Oral every 6 hours PRN  morphine  - Injectable 2 milliGRAM(s) IV Push every 6 hours PRN    VITALS:  T(F): 98.2  HR: 85  BP: 172/74  RR: 18  SpO2: 97%    PHYSICAL EXAM  GEN: no distress, comfortable  PULM: BS heard b/l equal, No wheezing  CVS: S1S2 present, no rubs or gallops  ABD: Soft, non-distended, no guarding; non-tender  EXT: No lower extremity edema  NEURO: A&Ox3, moving all extremities    LABS                        11.3   8.50  )-----------( 388      ( 31 Jan 2022 04:30 )             33.4     01-31    140  |  102  |  16  ----------------------------<  99  4.2   |  29  |  0.6<L>    Ca    9.1      31 Jan 2022 04:30  Phos  2.9     01-31  Mg     1.6     01-31    TPro  5.9<L>  /  Alb  4.0  /  TBili  0.2  /  DBili  <0.2  /  AST  19  /  ALT  14  /  AlkPhos  68  01-31    PT/INR - ( 31 Jan 2022 04:30 )   PT: 12.20 sec;   INR: 1.06 ratio         PTT - ( 31 Jan 2022 04:30 )  PTT:30.7 sec  Urinalysis Basic - ( 30 Jan 2022 17:58 )    Color: Light Yellow / Appearance: Clear / SG: >1.050 / pH: x  Gluc: x / Ketone: Small  / Bili: Negative / Urobili: <2 mg/dL   Blood: x / Protein: 30 mg/dL / Nitrite: Negative   Leuk Esterase: Small / RBC: 2 /HPF / WBC 15 /HPF   Sq Epi: x / Non Sq Epi: 3 /HPF / Bacteria: Negative        Lactate, Blood: 0.9 mmol/L (01-31-22 @ 04:30)  Troponin T, Serum: <0.01 ng/mL (01-30-22 @ 23:15)      CARDIAC MARKERS ( 30 Jan 2022 23:15 )  x     / <0.01 ng/mL / x     / x     / x      CARDIAC MARKERS ( 30 Jan 2022 12:00 )  x     / 0.02 ng/mL / x     / x     / x          RADIOLOGY

## 2022-01-31 NOTE — ED ADULT NURSE REASSESSMENT NOTE - NS ED NURSE REASSESS COMMENT FT1
Md Krystina Dunne on [id number] made aware Patient is requesting to sign out AMA, stating she has total resolution of Abdominal pain N/V and was able to tolerate PO intake twice with not adverse symptoms. As per MD , he will review chart , assess patient and if possible Discharge.

## 2022-01-31 NOTE — ED ADULT NURSE REASSESSMENT NOTE - NS ED NURSE REASSESS COMMENT FT1
MD Pham made aware that after MRI Patient refused to stay for treatment . When told she will receive AMA forms to sign , patient stated she has been hear long enough and does not want them. IV removed as per patient request and patient left. Steady gait was observed and patient is A&O x 4 at this time

## 2022-01-31 NOTE — ED ADULT NURSE REASSESSMENT NOTE - NS ED NURSE REASSESS COMMENT FT1
Patient assessed VSS. Patient updated on plan of care and verbalized understanding. Patient states she had full resolution of all N/V and abdominal pain. Denies any CP or SOB currently. Requested MD be made aware. Patient would like to sign out AMA and schedule outpatient MRI.

## 2022-01-31 NOTE — PROGRESS NOTE ADULT - ASSESSMENT
# nausea and vomiting- possible gastroenteritis  resolved  normal LFT  US: mildly dialated CBD; r/o obstructive lesion  CT abd; cholelithiasis, normal pancreas  MRCP pending    # incidental radiological findings  splenic lesion-non specific r/o parasitic infection; less likely  liver lesion  patient has cats all her life, no eosinophilia  MR abd to identify lesion better    # mild trop elevation- normal on trending; denies any chest pain    D/w plan based on MRI results  prepare for discharge

## 2022-01-31 NOTE — PHYSICAL THERAPY INITIAL EVALUATION ADULT - GENERAL OBSERVATIONS, REHAB EVAL
8:50-9:10 pt encountered in ED stretcher, + IV.  She is able to ambulate independently without assistive device.  There is no need for skilled PT at this time.

## 2022-01-31 NOTE — CONSULT NOTE ADULT - SUBJECTIVE AND OBJECTIVE BOX
Patient is a 85 y/o female with PMHx of COPD who was BIBEMS from home after developing abdominal pain. Patient has been in her USOH until around 48 hours prior to hospital presentation. She notes she developed upper abdominal pain, both right and left sided, which radiated down towards the lower abdomen. She noted pain was diffuse at times, associated with some nausea and emesis to the point that she didn't want to eat. Pain had no alleviating factors. She notes to me pain if fully gone, she is requesting to eat and to go home.        PAST MEDICAL & SURGICAL HISTORY:  COPD (chronic obstructive pulmonary disease)      Social History  Denies Current Tobacco use  Denies Current ETOH use  Denies Current Illicit Drug use   Lives at home alone, does all of her ADL's  Notes she is currently between PMD, does not have a GI doctor       Family Hx:  Father: Non Contributory   Mother: Non Contributory      MEDICATIONS  (STANDING):  chlorhexidine 4% Liquid 1 Application(s) Topical <User Schedule>  enoxaparin Injectable 40 milliGRAM(s) SubCutaneous daily  lactated ringers. 1000 milliLiter(s) (100 mL/Hr) IV Continuous <Continuous>  lactated ringers. 1000 milliLiter(s) (50 mL/Hr) IV Continuous <Continuous>  pantoprazole    Tablet 40 milliGRAM(s) Oral before breakfast  senna 2 Tablet(s) Oral daily    MEDICATIONS  (PRN):  acetaminophen     Tablet .. 650 milliGRAM(s) Oral every 6 hours PRN Mild Pain (1 - 3)  morphine  - Injectable 2 milliGRAM(s) IV Push every 6 hours PRN Severe Pain (7 - 10)      Allergies  Allergy Status Unknown      Review of Systems  General:  Denies Fatigue, Denies Fever, Denies Weakness ,Denies Weight Loss   HEENT: Denies Trouble Swallowing ,Denies  Sore Throat , Denies Change in hearing/vision/speech ,Denies Dizziness    Cardio: Denies  Chest Pain , Palpitations    Respiratory: Denies worsening of SOB, Denies Cough  Abdomen: See detailed HPI  Neuro: Denies Headache Denies Dizziness, Denies Paresthesias  MSK: Denies pain in Bones/Joints/Muscles   Psych: Patient denies depression, denies suicidal or homicidal ideations  Integ: Patient Denies rash, or new skin lesions       Vital Signs   T(F): 98 (31 Jan 2022 08:00), Max: 98.5 (30 Jan 2022 11:59)  HR: 79 (30 Jan 2022 22:05) (78 - 101)  BP: 156/72 (31 Jan 2022 08:00) (122/60 - 156/72)  RR: 18 (31 Jan 2022 08:00) (18 - 18)  SpO2: 96% (31 Jan 2022 08:00) (96% - 99%)  Physical Exam  Gen: NAD  HEENT: NC/AT, Mucosal Membranes Moist, no icteric sclera  Cardio: S1/S2 No S3/S4, Regular  Resp: CTA B/L  Abdomen: Soft, ND/NT  Neuro: AAOx3, Cranial Nerve II-XII intact   Extremities: FROM x 4  Skin: No jaundice         Labs:                          11.3   8.50  )-----------( 388      ( 31 Jan 2022 04:30 )             33.4       Auto Neutrophil %: 73.3 % (01-31-22 @ 04:30)  Auto Immature Granulocyte %: 0.5 % (01-31-22 @ 04:30)  Auto Neutrophil %: 82.5 % (01-30-22 @ 12:00)  Auto Immature Granulocyte %: 0.4 % (01-30-22 @ 12:00)    01-31    140  |  102  |  16  ----------------------------<  99  4.2   |  29  |  0.6<L>      Calcium, Total Serum: 9.1 mg/dL (01-31-22 @ 04:30)      LFTs:             5.9  | 0.2  | 19       ------------------[68      ( 31 Jan 2022 04:30 )  4.0  | <0.2 | 14            Lactate, Blood: 0.9 mmol/L (01-31-22 @ 04:30)  Lactate, Blood: 2.3 mmol/L (01-30-22 @ 12:00)      Coags:     12.20  ----< 1.06    ( 31 Jan 2022 04:30 )     30.7          RADIOLOGY & ADDITIONAL STUDIES:  CT Abdomen and Pelvis w/ IV Cont 01.30.22   IMPRESSION:    Cholelithiasis, better visualized on ultrasound from the same day. No CT   evidence of cholecystitis.    Distended urinary bladder,    Septated peripherally calcified centrally hypodense structure noted   within the spleen measuring 2.2 x 1.5 cm. Finding is nonspecific, however   differential includes parasitic infection. Recommend ultrasound for   further evaluation.    Indeterminate left hepatic lobe hypodensity measuring 1.3 cm.   Nonemergent/outpatient MRI hepatic protocol can be obtained for further   evaluation.    Tree-in-bud nodularity of the left lower lobe, may be seen with small   airway disease.      US Abdomen Upper Quadrant Right 01.30.22   IMPRESSION:    Mildly dilated common bile duct and proximal pancreatic duct. An   obstructing processis not excluded    Further evaluation with pancreatic MRI with gadolinium and MRCP is   recommended    Cholelithiasis.

## 2022-02-01 LAB
CULTURE RESULTS: SIGNIFICANT CHANGE UP
SPECIMEN SOURCE: SIGNIFICANT CHANGE UP

## 2022-02-05 LAB
CULTURE RESULTS: SIGNIFICANT CHANGE UP
SPECIMEN SOURCE: SIGNIFICANT CHANGE UP

## 2022-02-08 ENCOUNTER — APPOINTMENT (OUTPATIENT)
Age: 87
End: 2022-02-08

## 2022-02-14 DIAGNOSIS — Z85.41 PERSONAL HISTORY OF MALIGNANT NEOPLASM OF CERVIX UTERI: ICD-10-CM

## 2022-02-14 DIAGNOSIS — J44.9 CHRONIC OBSTRUCTIVE PULMONARY DISEASE, UNSPECIFIED: ICD-10-CM

## 2022-02-14 DIAGNOSIS — K80.20 CALCULUS OF GALLBLADDER WITHOUT CHOLECYSTITIS WITHOUT OBSTRUCTION: ICD-10-CM

## 2022-02-14 DIAGNOSIS — K52.9 NONINFECTIVE GASTROENTERITIS AND COLITIS, UNSPECIFIED: ICD-10-CM

## 2022-02-14 DIAGNOSIS — K83.8 OTHER SPECIFIED DISEASES OF BILIARY TRACT: ICD-10-CM

## 2022-02-14 DIAGNOSIS — R77.8 OTHER SPECIFIED ABNORMALITIES OF PLASMA PROTEINS: ICD-10-CM

## 2022-02-14 DIAGNOSIS — E04.1 NONTOXIC SINGLE THYROID NODULE: ICD-10-CM

## 2022-02-14 DIAGNOSIS — Z96.652 PRESENCE OF LEFT ARTIFICIAL KNEE JOINT: ICD-10-CM

## 2022-10-19 ENCOUNTER — APPOINTMENT (OUTPATIENT)
Age: 87
End: 2022-10-19

## 2022-10-19 VITALS
WEIGHT: 105 LBS | HEIGHT: 63 IN | SYSTOLIC BLOOD PRESSURE: 116 MMHG | BODY MASS INDEX: 18.61 KG/M2 | DIASTOLIC BLOOD PRESSURE: 72 MMHG | RESPIRATION RATE: 14 BRPM | HEART RATE: 43 BPM | OXYGEN SATURATION: 90 %

## 2022-10-19 PROCEDURE — 71046 X-RAY EXAM CHEST 2 VIEWS: CPT

## 2022-10-19 PROCEDURE — 99214 OFFICE O/P EST MOD 30 MIN: CPT | Mod: 25

## 2022-10-19 NOTE — PROCEDURE
[FreeTextEntry1] : CXR PA/ LAT SOB\par \par BL INCREASE MARKING/ NO CARDIOMEGALY/ HYPERINFLATED\par \par IMP\par \par HYPERINFLATED

## 2022-10-19 NOTE — HISTORY OF PRESENT ILLNESS
[TextBox_4] : COPD ON ADVAIR ONCE DAILY AND ALBTEROL\par SP BL KNEE SX NOV 21 AND MAY 22\par SOB ON MINIMAL EXERTION WORSENING\par COUGH/ ALLERGIES NO WHEEZING\par FOLLOWED BY CARDIO FOR ? LEAKY VALVE

## 2022-10-19 NOTE — PHYSICAL EXAM
[No Acute Distress] : no acute distress [Normal Oropharynx] : normal oropharynx [Normal Appearance] : normal appearance [No Neck Mass] : no neck mass [No Abnormalities] : no abnormalities [Benign] : benign [Normal Gait] : normal gait [No Clubbing] : no clubbing [No Cyanosis] : no cyanosis [No Edema] : no edema [FROM] : FROM [Normal Color/ Pigmentation] : normal color/ pigmentation [No Focal Deficits] : no focal deficits [Oriented x3] : oriented x3 [Normal Affect] : normal affect [TextBox_54] : TON 3/6 [TextBox_68] : DEC BS BOTH BASES

## 2022-10-19 NOTE — DISCUSSION/SUMMARY
[FreeTextEntry1] : 1- COPD EX SMOKER STOPPED 1980 ON ADVAIR\par 2- SOB ON EXERTION WORSENING ( SP BL KNEE SURGERY), BLOOD TEST D DIMER/ CHEST CT WITH MO WITHOUT\par \par HEART MURMUR/ ECHO CARDIO FUP

## 2022-10-20 ENCOUNTER — LABORATORY RESULT (OUTPATIENT)
Age: 87
End: 2022-10-20

## 2022-11-25 ENCOUNTER — OUTPATIENT (OUTPATIENT)
Dept: OUTPATIENT SERVICES | Facility: HOSPITAL | Age: 87
LOS: 1 days | Discharge: HOME | End: 2022-11-25

## 2022-11-25 ENCOUNTER — RESULT REVIEW (OUTPATIENT)
Age: 87
End: 2022-11-25

## 2022-11-25 DIAGNOSIS — R06.00 DYSPNEA, UNSPECIFIED: ICD-10-CM

## 2022-11-25 PROCEDURE — 71260 CT THORAX DX C+: CPT | Mod: 26,MH

## 2022-12-02 ENCOUNTER — NON-APPOINTMENT (OUTPATIENT)
Age: 87
End: 2022-12-02

## 2022-12-06 ENCOUNTER — APPOINTMENT (OUTPATIENT)
Age: 87
End: 2022-12-06

## 2022-12-06 PROCEDURE — 94729 DIFFUSING CAPACITY: CPT

## 2022-12-06 PROCEDURE — 94010 BREATHING CAPACITY TEST: CPT

## 2022-12-06 PROCEDURE — 94727 GAS DIL/WSHOT DETER LNG VOL: CPT

## 2022-12-09 ENCOUNTER — APPOINTMENT (OUTPATIENT)
Age: 87
End: 2022-12-09

## 2022-12-09 PROCEDURE — 99442: CPT | Mod: 95

## 2023-01-26 ENCOUNTER — OUTPATIENT (OUTPATIENT)
Dept: OUTPATIENT SERVICES | Facility: HOSPITAL | Age: 88
LOS: 1 days | End: 2023-01-26

## 2023-01-26 DIAGNOSIS — J44.9 CHRONIC OBSTRUCTIVE PULMONARY DISEASE, UNSPECIFIED: ICD-10-CM

## 2023-02-09 ENCOUNTER — OUTPATIENT (OUTPATIENT)
Dept: OUTPATIENT SERVICES | Facility: HOSPITAL | Age: 88
LOS: 1 days | Discharge: HOME | End: 2023-02-09
Payer: MEDICARE

## 2023-02-09 DIAGNOSIS — J44.9 CHRONIC OBSTRUCTIVE PULMONARY DISEASE, UNSPECIFIED: ICD-10-CM

## 2023-02-09 PROCEDURE — 94626 PHY/QHP OP PULM RHB W/MNTR: CPT

## 2023-02-10 DIAGNOSIS — J44.9 CHRONIC OBSTRUCTIVE PULMONARY DISEASE, UNSPECIFIED: ICD-10-CM

## 2023-02-16 ENCOUNTER — OUTPATIENT (OUTPATIENT)
Dept: OUTPATIENT SERVICES | Facility: HOSPITAL | Age: 88
LOS: 1 days | Discharge: HOME | End: 2023-02-16

## 2023-02-16 DIAGNOSIS — J44.9 CHRONIC OBSTRUCTIVE PULMONARY DISEASE, UNSPECIFIED: ICD-10-CM

## 2023-02-21 ENCOUNTER — OUTPATIENT (OUTPATIENT)
Dept: OUTPATIENT SERVICES | Facility: HOSPITAL | Age: 88
LOS: 1 days | Discharge: HOME | End: 2023-02-21

## 2023-02-21 DIAGNOSIS — J44.9 CHRONIC OBSTRUCTIVE PULMONARY DISEASE, UNSPECIFIED: ICD-10-CM

## 2023-02-23 ENCOUNTER — OUTPATIENT (OUTPATIENT)
Dept: OUTPATIENT SERVICES | Facility: HOSPITAL | Age: 88
LOS: 1 days | Discharge: HOME | End: 2023-02-23

## 2023-02-23 DIAGNOSIS — J44.9 CHRONIC OBSTRUCTIVE PULMONARY DISEASE, UNSPECIFIED: ICD-10-CM

## 2023-02-28 ENCOUNTER — APPOINTMENT (OUTPATIENT)
Dept: PULMONOLOGY | Facility: CLINIC | Age: 88
End: 2023-02-28

## 2023-03-02 ENCOUNTER — OUTPATIENT (OUTPATIENT)
Dept: OUTPATIENT SERVICES | Facility: HOSPITAL | Age: 88
LOS: 1 days | Discharge: HOME | End: 2023-03-02
Payer: MEDICARE

## 2023-03-02 DIAGNOSIS — J44.9 CHRONIC OBSTRUCTIVE PULMONARY DISEASE, UNSPECIFIED: ICD-10-CM

## 2023-03-02 PROCEDURE — 94626 PHY/QHP OP PULM RHB W/MNTR: CPT

## 2023-03-09 ENCOUNTER — OUTPATIENT (OUTPATIENT)
Dept: OUTPATIENT SERVICES | Facility: HOSPITAL | Age: 88
LOS: 1 days | Discharge: HOME | End: 2023-03-09

## 2023-03-09 DIAGNOSIS — J44.9 CHRONIC OBSTRUCTIVE PULMONARY DISEASE, UNSPECIFIED: ICD-10-CM

## 2023-03-14 ENCOUNTER — APPOINTMENT (OUTPATIENT)
Age: 88
End: 2023-03-14

## 2023-03-16 ENCOUNTER — OUTPATIENT (OUTPATIENT)
Dept: OUTPATIENT SERVICES | Facility: HOSPITAL | Age: 88
LOS: 1 days | Discharge: HOME | End: 2023-03-16

## 2023-03-16 ENCOUNTER — APPOINTMENT (OUTPATIENT)
Age: 88
End: 2023-03-16

## 2023-03-16 DIAGNOSIS — J44.9 CHRONIC OBSTRUCTIVE PULMONARY DISEASE, UNSPECIFIED: ICD-10-CM

## 2023-03-21 ENCOUNTER — OUTPATIENT (OUTPATIENT)
Dept: OUTPATIENT SERVICES | Facility: HOSPITAL | Age: 88
LOS: 1 days | Discharge: HOME | End: 2023-03-21

## 2023-03-21 ENCOUNTER — APPOINTMENT (OUTPATIENT)
Age: 88
End: 2023-03-21

## 2023-03-21 DIAGNOSIS — J44.9 CHRONIC OBSTRUCTIVE PULMONARY DISEASE, UNSPECIFIED: ICD-10-CM

## 2023-03-22 ENCOUNTER — APPOINTMENT (OUTPATIENT)
Dept: OTOLARYNGOLOGY | Facility: CLINIC | Age: 88
End: 2023-03-22
Payer: MEDICARE

## 2023-03-22 VITALS — HEIGHT: 62 IN | BODY MASS INDEX: 19.14 KG/M2 | WEIGHT: 104 LBS

## 2023-03-22 DIAGNOSIS — J34.89 OTHER SPECIFIED DISORDERS OF NOSE AND NASAL SINUSES: ICD-10-CM

## 2023-03-22 PROCEDURE — 99204 OFFICE O/P NEW MOD 45 MIN: CPT | Mod: 25

## 2023-03-22 PROCEDURE — 31231 NASAL ENDOSCOPY DX: CPT

## 2023-03-22 RX ORDER — AZELASTINE HYDROCHLORIDE 137 UG/1
0.1 SPRAY, METERED NASAL DAILY
Qty: 1 | Refills: 6 | Status: ACTIVE | COMMUNITY
Start: 2023-03-22 | End: 1900-01-01

## 2023-03-22 RX ORDER — IPRATROPIUM BROMIDE 42 UG/1
0.06 SPRAY NASAL 3 TIMES DAILY
Qty: 1 | Refills: 6 | Status: ACTIVE | COMMUNITY
Start: 2023-03-22 | End: 1900-01-01

## 2023-03-22 NOTE — DATA REVIEWED
[de-identified] : Test was reviewed  and interpreted by me. See official report below.\par PROCEDURE DATE:  11/25/2022\par \par \par \par INTERPRETATION:  CLINICAL STATEMENT: Shortness of breath.\par \par TECHNIQUE: Multislice helical sections were obtained from the thoracic inlet to the lung bases during rapid administration of 100 mL Omnipaque 350 intravenous contrast using a CTA protocol. Thin sections were reconstructed through the pulmonary vasculature. Coronal and sagittal reformatted images are also submitted. 3-D/MIP postprocessing images were performed as well..\par \par \par COMPARISON CT: 1/21/2020\par \par FINDINGS:\par \par PULMONARY EMBOLUS: No central or segmental pulmonary embolus.\par \par LUNGS, PLEURA, AIRWAYS: Patent central tracheobronchial tree. No evidence of pleural effusion, pneumothorax or consolidation. Severe centrilobular emphysematous changes.\par \par THORACIC NODES: No mediastinal, hilar or axillary lymphadenopathy.\par \par MEDIASTINUM/GREAT VESSELS: Bilateral multinodular goiter with markedly enlarged left thyroid pole secondary to underlying nodularity versus mass measuring up to 6 cm. Resultant deviation of the trachea to the right. Normal heart size. No pericardial effusion. Normal caliber aorta. Coronary artery and thoracic aortic calcifications.\par \par VISUALIZED UPPER ABDOMEN: Small hiatal hernia. Unchanged mild thickening of the left adrenal. Peripherally calcified lesion in the spleen is unchanged\par \par BONES/SOFT TISSUES: Mild degenerative changes of the spine.\par \par \par IMPRESSION:\par \par \par No central or segmental pulmonary embolus.\par \par Extensive emphysematous changes.\par \par Unchanged appearance of the thyroid gland. Recommend thyroid ultrasound for further evaluation if not already performed.\par \par

## 2023-03-22 NOTE — HISTORY OF PRESENT ILLNESS
[de-identified] : PAtient presents today c/o allergies .History of allergies  having a hard time breathing from nose, nasal congestion . She is on cetirizine which helps mildly. ,  using saline nasal spray . Having  some post nasal  drip . No history of  nasal fracture or nasal surgery .Tonsillectomy  1941. Pt has histry of emphysema pulmonary notes deom 12/9/22 reviewed.

## 2023-03-22 NOTE — PHYSICAL EXAM
[Nasal Endoscopy Performed] : nasal endoscopy was performed, see procedure section for findings [de-identified] : edema  [Normal] : mucosa is normal [Midline] : trachea located in midline position

## 2023-03-22 NOTE — PROCEDURE
[Recalcitrant Symptoms] : recalcitrant symptoms  [None] : none [Flexible Endoscope] : examined with the flexible endoscope [Congested] : congested [Allergic] : allergic signs [Jamir] : jamir [de-identified] : allergic rhinitis

## 2023-03-23 ENCOUNTER — APPOINTMENT (OUTPATIENT)
Age: 88
End: 2023-03-23

## 2023-03-28 ENCOUNTER — APPOINTMENT (OUTPATIENT)
Age: 88
End: 2023-03-28

## 2023-03-30 ENCOUNTER — APPOINTMENT (OUTPATIENT)
Age: 88
End: 2023-03-30

## 2023-04-04 ENCOUNTER — APPOINTMENT (OUTPATIENT)
Age: 88
End: 2023-04-04

## 2023-04-06 ENCOUNTER — APPOINTMENT (OUTPATIENT)
Age: 88
End: 2023-04-06

## 2023-04-11 ENCOUNTER — APPOINTMENT (OUTPATIENT)
Age: 88
End: 2023-04-11

## 2023-04-13 ENCOUNTER — APPOINTMENT (OUTPATIENT)
Age: 88
End: 2023-04-13

## 2023-04-18 ENCOUNTER — APPOINTMENT (OUTPATIENT)
Age: 88
End: 2023-04-18

## 2023-04-20 ENCOUNTER — APPOINTMENT (OUTPATIENT)
Age: 88
End: 2023-04-20

## 2023-04-25 ENCOUNTER — APPOINTMENT (OUTPATIENT)
Age: 88
End: 2023-04-25

## 2023-04-27 ENCOUNTER — APPOINTMENT (OUTPATIENT)
Age: 88
End: 2023-04-27

## 2023-05-02 ENCOUNTER — APPOINTMENT (OUTPATIENT)
Dept: OTOLARYNGOLOGY | Facility: CLINIC | Age: 88
End: 2023-05-02
Payer: MEDICARE

## 2023-05-02 ENCOUNTER — APPOINTMENT (OUTPATIENT)
Age: 88
End: 2023-05-02

## 2023-05-02 DIAGNOSIS — J30.9 ALLERGIC RHINITIS, UNSPECIFIED: ICD-10-CM

## 2023-05-02 DIAGNOSIS — R09.81 NASAL CONGESTION: ICD-10-CM

## 2023-05-02 DIAGNOSIS — R06.89 OTHER ABNORMALITIES OF BREATHING: ICD-10-CM

## 2023-05-02 PROCEDURE — 99213 OFFICE O/P EST LOW 20 MIN: CPT | Mod: 25

## 2023-05-02 PROCEDURE — 31231 NASAL ENDOSCOPY DX: CPT

## 2023-05-02 RX ORDER — FEXOFENADINE HCL 60 MG/1
60 TABLET, FILM COATED ORAL
Qty: 120 | Refills: 0 | Status: ACTIVE | COMMUNITY
Start: 2023-05-02 | End: 1900-01-01

## 2023-05-02 NOTE — REASON FOR VISIT
[Subsequent Evaluation] : a subsequent evaluation for [FreeTextEntry2] : rhinorrhea , nasal congestion

## 2023-05-02 NOTE — PHYSICAL EXAM
[Nasal Endoscopy Performed] : nasal endoscopy was performed, see procedure section for findings [de-identified] : edema  [Normal] : mucosa is normal [Midline] : trachea located in midline position

## 2023-05-02 NOTE — HISTORY OF PRESENT ILLNESS
[FreeTextEntry1] : Patient  returns today c/o rhinorrhea , nasal congestion . Allergies are worsening , not able to exercise  properly  due to allergies . He is  using nasal saline ,  didn't like the  other  nasal sprays . She has tried  levocetirizine ,  took medication for two day made her dizzy . Pt is on zyrtec and has some effect. Pt is on albuterol.

## 2023-05-02 NOTE — PROCEDURE
[None] : none [Flexible Endoscope] : examined with the flexible endoscope [Congested] : congested [Allergic] : allergic signs [Jamir] : jamir [Normal] : the paranasal sinuses had no abnormalities [de-identified] : allergic rhinitis

## 2023-05-04 ENCOUNTER — APPOINTMENT (OUTPATIENT)
Age: 88
End: 2023-05-04

## 2023-05-09 ENCOUNTER — APPOINTMENT (OUTPATIENT)
Age: 88
End: 2023-05-09

## 2023-05-11 ENCOUNTER — APPOINTMENT (OUTPATIENT)
Age: 88
End: 2023-05-11

## 2023-05-16 ENCOUNTER — APPOINTMENT (OUTPATIENT)
Age: 88
End: 2023-05-16

## 2023-05-18 ENCOUNTER — APPOINTMENT (OUTPATIENT)
Age: 88
End: 2023-05-18

## 2023-05-23 ENCOUNTER — APPOINTMENT (OUTPATIENT)
Age: 88
End: 2023-05-23

## 2023-05-25 ENCOUNTER — APPOINTMENT (OUTPATIENT)
Age: 88
End: 2023-05-25

## 2023-05-30 ENCOUNTER — APPOINTMENT (OUTPATIENT)
Age: 88
End: 2023-05-30

## 2023-06-01 ENCOUNTER — APPOINTMENT (OUTPATIENT)
Age: 88
End: 2023-06-01

## 2023-06-04 ENCOUNTER — NON-APPOINTMENT (OUTPATIENT)
Age: 88
End: 2023-06-04

## 2023-06-06 ENCOUNTER — APPOINTMENT (OUTPATIENT)
Age: 88
End: 2023-06-06

## 2023-06-06 ENCOUNTER — NON-APPOINTMENT (OUTPATIENT)
Age: 88
End: 2023-06-06

## 2023-07-12 ENCOUNTER — APPOINTMENT (OUTPATIENT)
Dept: OTOLARYNGOLOGY | Facility: CLINIC | Age: 88
End: 2023-07-12

## 2023-12-10 ENCOUNTER — NON-APPOINTMENT (OUTPATIENT)
Age: 88
End: 2023-12-10

## 2023-12-11 ENCOUNTER — EMERGENCY (EMERGENCY)
Facility: HOSPITAL | Age: 88
LOS: 0 days | Discharge: HOME | End: 2023-12-11
Attending: EMERGENCY MEDICINE
Payer: MEDICARE

## 2023-12-11 VITALS
HEART RATE: 108 BPM | RESPIRATION RATE: 17 BRPM | SYSTOLIC BLOOD PRESSURE: 186 MMHG | WEIGHT: 104.94 LBS | OXYGEN SATURATION: 95 % | TEMPERATURE: 98 F | DIASTOLIC BLOOD PRESSURE: 86 MMHG

## 2023-12-11 VITALS
RESPIRATION RATE: 18 BRPM | DIASTOLIC BLOOD PRESSURE: 72 MMHG | OXYGEN SATURATION: 96 % | SYSTOLIC BLOOD PRESSURE: 152 MMHG | HEART RATE: 80 BPM

## 2023-12-11 DIAGNOSIS — I10 ESSENTIAL (PRIMARY) HYPERTENSION: ICD-10-CM

## 2023-12-11 DIAGNOSIS — J44.9 CHRONIC OBSTRUCTIVE PULMONARY DISEASE, UNSPECIFIED: ICD-10-CM

## 2023-12-11 DIAGNOSIS — R19.7 DIARRHEA, UNSPECIFIED: ICD-10-CM

## 2023-12-11 DIAGNOSIS — R53.83 OTHER FATIGUE: ICD-10-CM

## 2023-12-11 LAB
ALBUMIN SERPL ELPH-MCNC: 4.7 G/DL — SIGNIFICANT CHANGE UP (ref 3.5–5.2)
ALBUMIN SERPL ELPH-MCNC: 4.7 G/DL — SIGNIFICANT CHANGE UP (ref 3.5–5.2)
ALP SERPL-CCNC: 115 U/L — SIGNIFICANT CHANGE UP (ref 30–115)
ALP SERPL-CCNC: 115 U/L — SIGNIFICANT CHANGE UP (ref 30–115)
ALT FLD-CCNC: 15 U/L — SIGNIFICANT CHANGE UP (ref 0–41)
ALT FLD-CCNC: 15 U/L — SIGNIFICANT CHANGE UP (ref 0–41)
ANION GAP SERPL CALC-SCNC: 13 MMOL/L — SIGNIFICANT CHANGE UP (ref 7–14)
ANION GAP SERPL CALC-SCNC: 13 MMOL/L — SIGNIFICANT CHANGE UP (ref 7–14)
AST SERPL-CCNC: 21 U/L — SIGNIFICANT CHANGE UP (ref 0–41)
AST SERPL-CCNC: 21 U/L — SIGNIFICANT CHANGE UP (ref 0–41)
BASOPHILS # BLD AUTO: 0.05 K/UL — SIGNIFICANT CHANGE UP (ref 0–0.2)
BASOPHILS # BLD AUTO: 0.05 K/UL — SIGNIFICANT CHANGE UP (ref 0–0.2)
BASOPHILS NFR BLD AUTO: 0.5 % — SIGNIFICANT CHANGE UP (ref 0–1)
BASOPHILS NFR BLD AUTO: 0.5 % — SIGNIFICANT CHANGE UP (ref 0–1)
BILIRUB SERPL-MCNC: 0.3 MG/DL — SIGNIFICANT CHANGE UP (ref 0.2–1.2)
BILIRUB SERPL-MCNC: 0.3 MG/DL — SIGNIFICANT CHANGE UP (ref 0.2–1.2)
BUN SERPL-MCNC: 11 MG/DL — SIGNIFICANT CHANGE UP (ref 10–20)
BUN SERPL-MCNC: 11 MG/DL — SIGNIFICANT CHANGE UP (ref 10–20)
C DIFF BY PCR RESULT: SIGNIFICANT CHANGE UP
C DIFF BY PCR RESULT: SIGNIFICANT CHANGE UP
CALCIUM SERPL-MCNC: 10.2 MG/DL — SIGNIFICANT CHANGE UP (ref 8.4–10.5)
CALCIUM SERPL-MCNC: 10.2 MG/DL — SIGNIFICANT CHANGE UP (ref 8.4–10.5)
CHLORIDE SERPL-SCNC: 95 MMOL/L — LOW (ref 98–110)
CHLORIDE SERPL-SCNC: 95 MMOL/L — LOW (ref 98–110)
CO2 SERPL-SCNC: 27 MMOL/L — SIGNIFICANT CHANGE UP (ref 17–32)
CO2 SERPL-SCNC: 27 MMOL/L — SIGNIFICANT CHANGE UP (ref 17–32)
CREAT SERPL-MCNC: 0.7 MG/DL — SIGNIFICANT CHANGE UP (ref 0.7–1.5)
CREAT SERPL-MCNC: 0.7 MG/DL — SIGNIFICANT CHANGE UP (ref 0.7–1.5)
EGFR: 84 ML/MIN/1.73M2 — SIGNIFICANT CHANGE UP
EGFR: 84 ML/MIN/1.73M2 — SIGNIFICANT CHANGE UP
EOSINOPHIL # BLD AUTO: 0.06 K/UL — SIGNIFICANT CHANGE UP (ref 0–0.7)
EOSINOPHIL # BLD AUTO: 0.06 K/UL — SIGNIFICANT CHANGE UP (ref 0–0.7)
EOSINOPHIL NFR BLD AUTO: 0.6 % — SIGNIFICANT CHANGE UP (ref 0–8)
EOSINOPHIL NFR BLD AUTO: 0.6 % — SIGNIFICANT CHANGE UP (ref 0–8)
GLUCOSE SERPL-MCNC: 109 MG/DL — HIGH (ref 70–99)
GLUCOSE SERPL-MCNC: 109 MG/DL — HIGH (ref 70–99)
HCT VFR BLD CALC: 40.9 % — SIGNIFICANT CHANGE UP (ref 37–47)
HCT VFR BLD CALC: 40.9 % — SIGNIFICANT CHANGE UP (ref 37–47)
HGB BLD-MCNC: 13.9 G/DL — SIGNIFICANT CHANGE UP (ref 12–16)
HGB BLD-MCNC: 13.9 G/DL — SIGNIFICANT CHANGE UP (ref 12–16)
IMM GRANULOCYTES NFR BLD AUTO: 0.3 % — SIGNIFICANT CHANGE UP (ref 0.1–0.3)
IMM GRANULOCYTES NFR BLD AUTO: 0.3 % — SIGNIFICANT CHANGE UP (ref 0.1–0.3)
LACTATE SERPL-SCNC: 0.8 MMOL/L — SIGNIFICANT CHANGE UP (ref 0.7–2)
LACTATE SERPL-SCNC: 0.8 MMOL/L — SIGNIFICANT CHANGE UP (ref 0.7–2)
LIDOCAIN IGE QN: 52 U/L — SIGNIFICANT CHANGE UP (ref 7–60)
LIDOCAIN IGE QN: 52 U/L — SIGNIFICANT CHANGE UP (ref 7–60)
LYMPHOCYTES # BLD AUTO: 1.42 K/UL — SIGNIFICANT CHANGE UP (ref 1.2–3.4)
LYMPHOCYTES # BLD AUTO: 1.42 K/UL — SIGNIFICANT CHANGE UP (ref 1.2–3.4)
LYMPHOCYTES # BLD AUTO: 14.5 % — LOW (ref 20.5–51.1)
LYMPHOCYTES # BLD AUTO: 14.5 % — LOW (ref 20.5–51.1)
MCHC RBC-ENTMCNC: 30.5 PG — SIGNIFICANT CHANGE UP (ref 27–31)
MCHC RBC-ENTMCNC: 30.5 PG — SIGNIFICANT CHANGE UP (ref 27–31)
MCHC RBC-ENTMCNC: 34 G/DL — SIGNIFICANT CHANGE UP (ref 32–37)
MCHC RBC-ENTMCNC: 34 G/DL — SIGNIFICANT CHANGE UP (ref 32–37)
MCV RBC AUTO: 89.9 FL — SIGNIFICANT CHANGE UP (ref 81–99)
MCV RBC AUTO: 89.9 FL — SIGNIFICANT CHANGE UP (ref 81–99)
MONOCYTES # BLD AUTO: 0.75 K/UL — HIGH (ref 0.1–0.6)
MONOCYTES # BLD AUTO: 0.75 K/UL — HIGH (ref 0.1–0.6)
MONOCYTES NFR BLD AUTO: 7.7 % — SIGNIFICANT CHANGE UP (ref 1.7–9.3)
MONOCYTES NFR BLD AUTO: 7.7 % — SIGNIFICANT CHANGE UP (ref 1.7–9.3)
NEUTROPHILS # BLD AUTO: 7.49 K/UL — HIGH (ref 1.4–6.5)
NEUTROPHILS # BLD AUTO: 7.49 K/UL — HIGH (ref 1.4–6.5)
NEUTROPHILS NFR BLD AUTO: 76.4 % — HIGH (ref 42.2–75.2)
NEUTROPHILS NFR BLD AUTO: 76.4 % — HIGH (ref 42.2–75.2)
NRBC # BLD: 0 /100 WBCS — SIGNIFICANT CHANGE UP (ref 0–0)
NRBC # BLD: 0 /100 WBCS — SIGNIFICANT CHANGE UP (ref 0–0)
PLATELET # BLD AUTO: 385 K/UL — SIGNIFICANT CHANGE UP (ref 130–400)
PLATELET # BLD AUTO: 385 K/UL — SIGNIFICANT CHANGE UP (ref 130–400)
PMV BLD: 8.8 FL — SIGNIFICANT CHANGE UP (ref 7.4–10.4)
PMV BLD: 8.8 FL — SIGNIFICANT CHANGE UP (ref 7.4–10.4)
POTASSIUM SERPL-MCNC: 4.5 MMOL/L — SIGNIFICANT CHANGE UP (ref 3.5–5)
POTASSIUM SERPL-MCNC: 4.5 MMOL/L — SIGNIFICANT CHANGE UP (ref 3.5–5)
POTASSIUM SERPL-SCNC: 4.5 MMOL/L — SIGNIFICANT CHANGE UP (ref 3.5–5)
POTASSIUM SERPL-SCNC: 4.5 MMOL/L — SIGNIFICANT CHANGE UP (ref 3.5–5)
PROT SERPL-MCNC: 7.4 G/DL — SIGNIFICANT CHANGE UP (ref 6–8)
PROT SERPL-MCNC: 7.4 G/DL — SIGNIFICANT CHANGE UP (ref 6–8)
RBC # BLD: 4.55 M/UL — SIGNIFICANT CHANGE UP (ref 4.2–5.4)
RBC # BLD: 4.55 M/UL — SIGNIFICANT CHANGE UP (ref 4.2–5.4)
RBC # FLD: 13.4 % — SIGNIFICANT CHANGE UP (ref 11.5–14.5)
RBC # FLD: 13.4 % — SIGNIFICANT CHANGE UP (ref 11.5–14.5)
SODIUM SERPL-SCNC: 135 MMOL/L — SIGNIFICANT CHANGE UP (ref 135–146)
SODIUM SERPL-SCNC: 135 MMOL/L — SIGNIFICANT CHANGE UP (ref 135–146)
WBC # BLD: 9.8 K/UL — SIGNIFICANT CHANGE UP (ref 4.8–10.8)
WBC # BLD: 9.8 K/UL — SIGNIFICANT CHANGE UP (ref 4.8–10.8)
WBC # FLD AUTO: 9.8 K/UL — SIGNIFICANT CHANGE UP (ref 4.8–10.8)
WBC # FLD AUTO: 9.8 K/UL — SIGNIFICANT CHANGE UP (ref 4.8–10.8)

## 2023-12-11 PROCEDURE — 80053 COMPREHEN METABOLIC PANEL: CPT

## 2023-12-11 PROCEDURE — 87177 OVA AND PARASITES SMEARS: CPT

## 2023-12-11 PROCEDURE — 99284 EMERGENCY DEPT VISIT MOD MDM: CPT | Mod: GC

## 2023-12-11 PROCEDURE — 99283 EMERGENCY DEPT VISIT LOW MDM: CPT

## 2023-12-11 PROCEDURE — 83605 ASSAY OF LACTIC ACID: CPT

## 2023-12-11 PROCEDURE — 87493 C DIFF AMPLIFIED PROBE: CPT

## 2023-12-11 PROCEDURE — 87046 STOOL CULTR AEROBIC BACT EA: CPT

## 2023-12-11 PROCEDURE — 87045 FECES CULTURE AEROBIC BACT: CPT

## 2023-12-11 PROCEDURE — 87077 CULTURE AEROBIC IDENTIFY: CPT

## 2023-12-11 PROCEDURE — 85025 COMPLETE CBC W/AUTO DIFF WBC: CPT

## 2023-12-11 PROCEDURE — 83690 ASSAY OF LIPASE: CPT

## 2023-12-11 PROCEDURE — 36415 COLL VENOUS BLD VENIPUNCTURE: CPT

## 2023-12-11 RX ORDER — SODIUM CHLORIDE 9 MG/ML
1000 INJECTION, SOLUTION INTRAVENOUS ONCE
Refills: 0 | Status: COMPLETED | OUTPATIENT
Start: 2023-12-11 | End: 2023-12-11

## 2023-12-11 RX ADMIN — SODIUM CHLORIDE 1000 MILLILITER(S): 9 INJECTION, SOLUTION INTRAVENOUS at 11:13

## 2023-12-11 NOTE — ED PROVIDER NOTE - PATIENT PORTAL LINK FT
You can access the FollowMyHealth Patient Portal offered by [facility] by registering at the following website: [email or web address]. By joining [employer]’s FollowMyHealth portal, you will also be able to view your health information using other applications (apps) compatible with our system. You can access the FollowMyHealth Patient Portal offered by [facility] by registering at the following website: [email or web address]. By joining [employer]’s FollowMyHealth portal, you will also be able to view your health information using other applications (apps) compatible with our system.

## 2023-12-11 NOTE — ED ADULT NURSE NOTE - NSFALLUNIVINTERV_ED_ALL_ED
Bed/Stretcher in lowest position, wheels locked, appropriate side rails in place/Call bell, personal items and telephone in reach/Instruct patient to call for assistance before getting out of bed/chair/stretcher/Non-slip footwear applied when patient is off stretcher/[place] to call system/Physically safe environment - no spills, clutter or unnecessary equipment/Purposeful proactive rounding/Room/bathroom lighting operational, light cord in reach Bed/Stretcher in lowest position, wheels locked, appropriate side rails in place/Call bell, personal items and telephone in reach/Instruct patient to call for assistance before getting out of bed/chair/stretcher/Non-slip footwear applied when patient is off stretcher/[place] to call system/Physically safe environment - no spills, clutter or unnecessary equipment/Purposeful proactive rounding/Room/bathroom lighting operational, light cord in reach

## 2023-12-11 NOTE — ED PROVIDER NOTE - PROGRESS NOTE DETAILS
TJY: d/w pt regarding ability to f/u on stool results vs empiric tx; pt would prefer waiting. Will use rapid referral for new GI doctor. Return preacutions RE dehydration discussed.

## 2023-12-11 NOTE — ED PROVIDER NOTE - ATTENDING CONTRIBUTION TO CARE
88-year-old female with history of HTN, COPD, no other past medical history, presents with diarrhea. She states that 1 month ago she was constipated, and began using mineral oil and magnesium. For the past 2 weeks she has been having diarrhea, though worsened for the past 3 days. Patient states it is liquidy though has intermittently some form to it, and brown. States feels mildly tired but denies all other symptoms including dizziness, black or bloody stool, abdominal pain, fever, vomiting. On exam, afebrile, hemodynamically stable, saturating well on room air, NAD, well appearing, sitting comfortably in bed, no WOB, speaking full sentences, head NCAT, EOMI grossly, anicteric, MMM, RRR, nml S1/S2, no m/r/g, lungs CTAB, no w/r/r, abd soft, entirely NT, ND, no rebound or guarding, AAO, CN's 3-12 grossly intact, FULLER spontaneously, no leg cyanosis or edema, skin warm, well perfused, no rashes or hives. No abdominal pain or tenderness, with low suspicion for acute intra-abdominal process to warrant CT imaging. Character low suspicion for UTI to warrant UA. No black or bloody stool or alarm signs/symptoms, and labs unremarkable. Had shared decision-making with patient regarding empiric C. difficile treatment at this time, and patient opts against it and will wait for C. difficile PCR. She appears well-hydrated and given 1 L of IV fluids. Patient is well appearing, NAD, afebrile, hemodynamically stable. Any available tests and studies were discussed with patient. Discharged with instructions in further symptomatic care, return precautions, and need for close GI f/u.

## 2023-12-11 NOTE — ED PROVIDER NOTE - CARE PROVIDER_API CALL
Heber Wilkes  Gastroenterology  [address] [place] [id number]  Phone: [phone]  Fax: [phone]  Follow Up Time: 1-3 Days   Heber Wilkes  Gastroenterology  [address] [place] [id number]  Phone: [phone]  Fax: [phone]  Follow Up Time: 1-3 Days

## 2023-12-11 NOTE — ED ADULT TRIAGE NOTE - NS ED TRIAGE AVPU SCALE
Alert-The patient is alert, awake and responds to voice. The patient is oriented to time, place, and person. The triage nurse is able to obtain subjective information. Qbrexza Counseling:  I discussed with the patient the risks of Qbrexza including but not limited to headache, mydriasis, blurred vision, dry eyes, nasal dryness, dry mouth, dry throat, dry skin, urinary hesitation, and constipation.  Local skin reactions including erythema, burning, stinging, and itching can also occur.

## 2023-12-11 NOTE — ED ADULT TRIAGE NOTE - TEMPERATURE IN FAHRENHEIT (DEGREES F)
"Chief Complaint   Patient presents with     Abnormal Uterine Bleeding     Irreg menses       Initial /78 (BP Location: Left arm, Patient Position: Chair, Cuff Size: Adult Regular)  Temp 98  F (36.7  C) (Oral)  Ht 5' 4\" (1.626 m)  Wt 161 lb 4.8 oz (73.2 kg)  LMP 2018  BMI 27.69 kg/m2 Estimated body mass index is 27.69 kg/(m^2) as calculated from the following:    Height as of this encounter: 5' 4\" (1.626 m).    Weight as of this encounter: 161 lb 4.8 oz (73.2 kg).  BP completed using cuff size: regular        The following HM Due: none  Lab Results   Component Value Date    PAP NIL 2018       The following patient reported/Care Every where data was sent to:  P ABSTRACT QUALITY INITIATIVES [[id number]]      patient has appointment for today              " 98.1

## 2023-12-11 NOTE — ED PROVIDER NOTE - NSFOLLOWUPINSTRUCTIONS_ED_ALL_ED_FT
Gastroenterology follow-up: Our Emergency Department Referral Coordinators will be reaching out to you in the next 24-48 hours from 9:00am to 5:00pm with a follow up appointment. Please expect a phone call from the hospital in that time frame. If you do not receive a call or if you have any questions or concerns, you can reach them at [phone]    If you would like to start calling a provider to try and get an appointment yourself, a referral is included.       Diarrhea    Diarrhea is frequent loose or watery bowel movements that has many causes. Diarrhea can make you feel weak and cause you to become dehydrated. Diarrhea typically lasts 2–3 days, but can last longer if it is a sign of something more serious. Drink clear fluids to prevent dehydration. Eat bland, easy-to-digest foods as tolerated.     SEEK IMMEDIATE MEDICAL CARE IF YOU HAVE ANY OF THE FOLLOWING SYMPTOMS: high fevers, lightheadedness/dizziness, chest pain, black or bloody stools, shortness of breath, severe abdominal or back pain, or any signs of dehydration. Gastroenterology follow-up: Our Emergency Department Referral Coordinators will be reaching out to you in the next 24-48 hours from 9:00am to 5:00pm with a follow up appointment. Please expect a phone call from the hospital in that time frame. If you do not receive a call or if you have any questions or concerns, you can reach them at [phone]    If you would like to start calling a provider to try and get an appointment yourself, a referral is included.       Diarrhea    Diarrhea is frequent loose or watery bowel movements that has many causes. Diarrhea can make you feel weak and cause you to become dehydrated. Diarrhea typically lasts 2–3 days, but can last longer if it is a sign of something more serious. Drink clear fluids to prevent dehydration. Eat bland, easy-to-digest foods as tolerated.     SEEK IMMEDIATE MEDICAL CARE IF YOU HAVE ANY OF THE FOLLOWING SYMPTOMS: high fevers, lightheadedness/dizziness, chest pain, black or bloody stools, shortness of breath, severe abdominal or back pain, or any signs of dehydration.

## 2023-12-11 NOTE — ED PROVIDER NOTE - PHYSICAL EXAMINATION
VITAL SIGNS: I have reviewed nursing notes and confirm.  CONSTITUTIONAL: Well-appearing, non-toxic, in NAD  SKIN: Warm dry, normal skin turgor  HEAD: NCAT  EYES: No conjunctival injection, scleral anicteric  ENT: Moist mucous membranes  NECK: Supple; full ROM.  CARD: RRR, no murmurs, rubs or gallops  RESP: Clear to ausculation bilaterally.  No rales, rhonchi, or wheezing.  ABD: Soft, non-distended, non-tender, no rebound or guarding. No CVA tenderness  EXT: Full ROM, no bony tenderness, no pedal edema, no calf tenderness, cap refill 2+ b/l, normal skin turgor.   NEURO: Normal motor, normal sensory. CN II-XII grossly intact. Normal gait.  PSYCH: Cooperative, appropriate.

## 2023-12-11 NOTE — ED PROVIDER NOTE - OBJECTIVE STATEMENT
87yo female PMHx HTN presenting with 2 months of itnermittent diarrhea that was initially mucusy and evaluated by GI, resolved with a po antibiotic (unsure name), but had recurrence a week ago after taking po mineral oil for constipation. Pt w/o abdominal pain, no changes to po intake, no F/C, no urinary sx. 89yo female PMHx HTN presenting with 2 months of itnermittent diarrhea that was initially mucusy and evaluated by GI, resolved with a po antibiotic (unsure name), but had recurrence a week ago after taking po mineral oil for constipation. Pt w/o abdominal pain, no changes to po intake, no F/C, no urinary sx.

## 2023-12-11 NOTE — ED PROVIDER NOTE - PROVIDER TOKENS
PROVIDER:[TOKEN:[[id number]],FOLLOWUP:[1-3 Days]] PROVIDER:[TOKEN:[[id number]],FOLLOWUP:[1-3 Days]]

## 2023-12-13 LAB
CULTURE RESULTS: SIGNIFICANT CHANGE UP
SPECIMEN SOURCE: SIGNIFICANT CHANGE UP

## 2023-12-15 ENCOUNTER — NON-APPOINTMENT (OUTPATIENT)
Age: 88
End: 2023-12-15

## 2024-01-14 ENCOUNTER — NON-APPOINTMENT (OUTPATIENT)
Age: 89
End: 2024-01-14

## 2024-01-15 ENCOUNTER — EMERGENCY (EMERGENCY)
Facility: HOSPITAL | Age: 89
LOS: 0 days | Discharge: HOME | End: 2024-01-15
Attending: EMERGENCY MEDICINE
Payer: MEDICARE

## 2024-01-15 VITALS
RESPIRATION RATE: 20 BRPM | OXYGEN SATURATION: 96 % | DIASTOLIC BLOOD PRESSURE: 65 MMHG | TEMPERATURE: 98 F | SYSTOLIC BLOOD PRESSURE: 145 MMHG | HEART RATE: 97 BPM

## 2024-01-15 VITALS
DIASTOLIC BLOOD PRESSURE: 77 MMHG | TEMPERATURE: 98 F | OXYGEN SATURATION: 96 % | SYSTOLIC BLOOD PRESSURE: 163 MMHG | HEART RATE: 106 BPM | RESPIRATION RATE: 22 BRPM | WEIGHT: 102.07 LBS

## 2024-01-15 DIAGNOSIS — R06.02 SHORTNESS OF BREATH: ICD-10-CM

## 2024-01-15 DIAGNOSIS — J12.82 PNEUMONIA DUE TO CORONAVIRUS DISEASE 2019: ICD-10-CM

## 2024-01-15 DIAGNOSIS — R05.1 ACUTE COUGH: ICD-10-CM

## 2024-01-15 DIAGNOSIS — U07.1 COVID-19: ICD-10-CM

## 2024-01-15 DIAGNOSIS — R07.89 OTHER CHEST PAIN: ICD-10-CM

## 2024-01-15 DIAGNOSIS — I10 ESSENTIAL (PRIMARY) HYPERTENSION: ICD-10-CM

## 2024-01-15 DIAGNOSIS — J44.0 CHRONIC OBSTRUCTIVE PULMONARY DISEASE WITH (ACUTE) LOWER RESPIRATORY INFECTION: ICD-10-CM

## 2024-01-15 LAB
ALBUMIN SERPL ELPH-MCNC: 4.4 G/DL — SIGNIFICANT CHANGE UP (ref 3.5–5.2)
ALBUMIN SERPL ELPH-MCNC: 4.4 G/DL — SIGNIFICANT CHANGE UP (ref 3.5–5.2)
ALP SERPL-CCNC: 109 U/L — SIGNIFICANT CHANGE UP (ref 30–115)
ALP SERPL-CCNC: 109 U/L — SIGNIFICANT CHANGE UP (ref 30–115)
ALT FLD-CCNC: 17 U/L — SIGNIFICANT CHANGE UP (ref 0–41)
ALT FLD-CCNC: 17 U/L — SIGNIFICANT CHANGE UP (ref 0–41)
ANION GAP SERPL CALC-SCNC: 14 MMOL/L — SIGNIFICANT CHANGE UP (ref 7–14)
ANION GAP SERPL CALC-SCNC: 14 MMOL/L — SIGNIFICANT CHANGE UP (ref 7–14)
APTT BLD: 33.6 SEC — SIGNIFICANT CHANGE UP (ref 27–39.2)
APTT BLD: 33.6 SEC — SIGNIFICANT CHANGE UP (ref 27–39.2)
AST SERPL-CCNC: 25 U/L — SIGNIFICANT CHANGE UP (ref 0–41)
AST SERPL-CCNC: 25 U/L — SIGNIFICANT CHANGE UP (ref 0–41)
BASOPHILS # BLD AUTO: 0.05 K/UL — SIGNIFICANT CHANGE UP (ref 0–0.2)
BASOPHILS # BLD AUTO: 0.05 K/UL — SIGNIFICANT CHANGE UP (ref 0–0.2)
BASOPHILS NFR BLD AUTO: 0.5 % — SIGNIFICANT CHANGE UP (ref 0–1)
BASOPHILS NFR BLD AUTO: 0.5 % — SIGNIFICANT CHANGE UP (ref 0–1)
BILIRUB SERPL-MCNC: 0.2 MG/DL — SIGNIFICANT CHANGE UP (ref 0.2–1.2)
BILIRUB SERPL-MCNC: 0.2 MG/DL — SIGNIFICANT CHANGE UP (ref 0.2–1.2)
BUN SERPL-MCNC: 13 MG/DL — SIGNIFICANT CHANGE UP (ref 10–20)
BUN SERPL-MCNC: 13 MG/DL — SIGNIFICANT CHANGE UP (ref 10–20)
CALCIUM SERPL-MCNC: 10.1 MG/DL — SIGNIFICANT CHANGE UP (ref 8.4–10.5)
CALCIUM SERPL-MCNC: 10.1 MG/DL — SIGNIFICANT CHANGE UP (ref 8.4–10.5)
CHLORIDE SERPL-SCNC: 96 MMOL/L — LOW (ref 98–110)
CHLORIDE SERPL-SCNC: 96 MMOL/L — LOW (ref 98–110)
CO2 SERPL-SCNC: 24 MMOL/L — SIGNIFICANT CHANGE UP (ref 17–32)
CO2 SERPL-SCNC: 24 MMOL/L — SIGNIFICANT CHANGE UP (ref 17–32)
CREAT SERPL-MCNC: 0.7 MG/DL — SIGNIFICANT CHANGE UP (ref 0.7–1.5)
CREAT SERPL-MCNC: 0.7 MG/DL — SIGNIFICANT CHANGE UP (ref 0.7–1.5)
EGFR: 84 ML/MIN/1.73M2 — SIGNIFICANT CHANGE UP
EGFR: 84 ML/MIN/1.73M2 — SIGNIFICANT CHANGE UP
EOSINOPHIL # BLD AUTO: 0.04 K/UL — SIGNIFICANT CHANGE UP (ref 0–0.7)
EOSINOPHIL # BLD AUTO: 0.04 K/UL — SIGNIFICANT CHANGE UP (ref 0–0.7)
EOSINOPHIL NFR BLD AUTO: 0.4 % — SIGNIFICANT CHANGE UP (ref 0–8)
EOSINOPHIL NFR BLD AUTO: 0.4 % — SIGNIFICANT CHANGE UP (ref 0–8)
FLUAV AG NPH QL: SIGNIFICANT CHANGE UP
FLUBV AG NPH QL: SIGNIFICANT CHANGE UP
GAS PNL BLDV: SIGNIFICANT CHANGE UP
GAS PNL BLDV: SIGNIFICANT CHANGE UP
GLUCOSE SERPL-MCNC: 108 MG/DL — HIGH (ref 70–99)
GLUCOSE SERPL-MCNC: 108 MG/DL — HIGH (ref 70–99)
HCT VFR BLD CALC: 38.1 % — SIGNIFICANT CHANGE UP (ref 37–47)
HCT VFR BLD CALC: 38.1 % — SIGNIFICANT CHANGE UP (ref 37–47)
HGB BLD-MCNC: 13.4 G/DL — SIGNIFICANT CHANGE UP (ref 12–16)
HGB BLD-MCNC: 13.4 G/DL — SIGNIFICANT CHANGE UP (ref 12–16)
IMM GRANULOCYTES NFR BLD AUTO: 0.3 % — SIGNIFICANT CHANGE UP (ref 0.1–0.3)
IMM GRANULOCYTES NFR BLD AUTO: 0.3 % — SIGNIFICANT CHANGE UP (ref 0.1–0.3)
INR BLD: 0.97 RATIO — SIGNIFICANT CHANGE UP (ref 0.65–1.3)
INR BLD: 0.97 RATIO — SIGNIFICANT CHANGE UP (ref 0.65–1.3)
LYMPHOCYTES # BLD AUTO: 1.27 K/UL — SIGNIFICANT CHANGE UP (ref 1.2–3.4)
LYMPHOCYTES # BLD AUTO: 1.27 K/UL — SIGNIFICANT CHANGE UP (ref 1.2–3.4)
LYMPHOCYTES # BLD AUTO: 13.9 % — LOW (ref 20.5–51.1)
LYMPHOCYTES # BLD AUTO: 13.9 % — LOW (ref 20.5–51.1)
MAGNESIUM SERPL-MCNC: 1.7 MG/DL — LOW (ref 1.8–2.4)
MAGNESIUM SERPL-MCNC: 1.7 MG/DL — LOW (ref 1.8–2.4)
MCHC RBC-ENTMCNC: 30.6 PG — SIGNIFICANT CHANGE UP (ref 27–31)
MCHC RBC-ENTMCNC: 30.6 PG — SIGNIFICANT CHANGE UP (ref 27–31)
MCHC RBC-ENTMCNC: 35.2 G/DL — SIGNIFICANT CHANGE UP (ref 32–37)
MCHC RBC-ENTMCNC: 35.2 G/DL — SIGNIFICANT CHANGE UP (ref 32–37)
MCV RBC AUTO: 87 FL — SIGNIFICANT CHANGE UP (ref 81–99)
MCV RBC AUTO: 87 FL — SIGNIFICANT CHANGE UP (ref 81–99)
MONOCYTES # BLD AUTO: 0.87 K/UL — HIGH (ref 0.1–0.6)
MONOCYTES # BLD AUTO: 0.87 K/UL — HIGH (ref 0.1–0.6)
MONOCYTES NFR BLD AUTO: 9.5 % — HIGH (ref 1.7–9.3)
MONOCYTES NFR BLD AUTO: 9.5 % — HIGH (ref 1.7–9.3)
NEUTROPHILS # BLD AUTO: 6.88 K/UL — HIGH (ref 1.4–6.5)
NEUTROPHILS # BLD AUTO: 6.88 K/UL — HIGH (ref 1.4–6.5)
NEUTROPHILS NFR BLD AUTO: 75.4 % — HIGH (ref 42.2–75.2)
NEUTROPHILS NFR BLD AUTO: 75.4 % — HIGH (ref 42.2–75.2)
NRBC # BLD: 0 /100 WBCS — SIGNIFICANT CHANGE UP (ref 0–0)
NRBC # BLD: 0 /100 WBCS — SIGNIFICANT CHANGE UP (ref 0–0)
NT-PROBNP SERPL-SCNC: 257 PG/ML — SIGNIFICANT CHANGE UP (ref 0–300)
NT-PROBNP SERPL-SCNC: 257 PG/ML — SIGNIFICANT CHANGE UP (ref 0–300)
PLATELET # BLD AUTO: 463 K/UL — HIGH (ref 130–400)
PLATELET # BLD AUTO: 463 K/UL — HIGH (ref 130–400)
PMV BLD: 9.2 FL — SIGNIFICANT CHANGE UP (ref 7.4–10.4)
PMV BLD: 9.2 FL — SIGNIFICANT CHANGE UP (ref 7.4–10.4)
POTASSIUM SERPL-MCNC: 4.2 MMOL/L — SIGNIFICANT CHANGE UP (ref 3.5–5)
POTASSIUM SERPL-MCNC: 4.2 MMOL/L — SIGNIFICANT CHANGE UP (ref 3.5–5)
POTASSIUM SERPL-SCNC: 4.2 MMOL/L — SIGNIFICANT CHANGE UP (ref 3.5–5)
POTASSIUM SERPL-SCNC: 4.2 MMOL/L — SIGNIFICANT CHANGE UP (ref 3.5–5)
PROT SERPL-MCNC: 7.2 G/DL — SIGNIFICANT CHANGE UP (ref 6–8)
PROT SERPL-MCNC: 7.2 G/DL — SIGNIFICANT CHANGE UP (ref 6–8)
PROTHROM AB SERPL-ACNC: 11.1 SEC — SIGNIFICANT CHANGE UP (ref 9.95–12.87)
PROTHROM AB SERPL-ACNC: 11.1 SEC — SIGNIFICANT CHANGE UP (ref 9.95–12.87)
RBC # BLD: 4.38 M/UL — SIGNIFICANT CHANGE UP (ref 4.2–5.4)
RBC # BLD: 4.38 M/UL — SIGNIFICANT CHANGE UP (ref 4.2–5.4)
RBC # FLD: 13.5 % — SIGNIFICANT CHANGE UP (ref 11.5–14.5)
RBC # FLD: 13.5 % — SIGNIFICANT CHANGE UP (ref 11.5–14.5)
RSV RNA NPH QL NAA+NON-PROBE: SIGNIFICANT CHANGE UP
SARS-COV-2 RNA SPEC QL NAA+PROBE: DETECTED
SODIUM SERPL-SCNC: 134 MMOL/L — LOW (ref 135–146)
SODIUM SERPL-SCNC: 134 MMOL/L — LOW (ref 135–146)
TROPONIN T, HIGH SENSITIVITY RESULT: 10 NG/L — SIGNIFICANT CHANGE UP (ref 6–13)
TROPONIN T, HIGH SENSITIVITY RESULT: 10 NG/L — SIGNIFICANT CHANGE UP (ref 6–13)
TROPONIN T, HIGH SENSITIVITY RESULT: 12 NG/L — SIGNIFICANT CHANGE UP (ref 6–13)
WBC # BLD: 9.14 K/UL — SIGNIFICANT CHANGE UP (ref 4.8–10.8)
WBC # BLD: 9.14 K/UL — SIGNIFICANT CHANGE UP (ref 4.8–10.8)
WBC # FLD AUTO: 9.14 K/UL — SIGNIFICANT CHANGE UP (ref 4.8–10.8)
WBC # FLD AUTO: 9.14 K/UL — SIGNIFICANT CHANGE UP (ref 4.8–10.8)

## 2024-01-15 PROCEDURE — 93005 ELECTROCARDIOGRAM TRACING: CPT

## 2024-01-15 PROCEDURE — 71275 CT ANGIOGRAPHY CHEST: CPT | Mod: 26,MA

## 2024-01-15 PROCEDURE — 85730 THROMBOPLASTIN TIME PARTIAL: CPT

## 2024-01-15 PROCEDURE — 84484 ASSAY OF TROPONIN QUANT: CPT

## 2024-01-15 PROCEDURE — 82330 ASSAY OF CALCIUM: CPT

## 2024-01-15 PROCEDURE — 36415 COLL VENOUS BLD VENIPUNCTURE: CPT

## 2024-01-15 PROCEDURE — 86900 BLOOD TYPING SEROLOGIC ABO: CPT

## 2024-01-15 PROCEDURE — 99285 EMERGENCY DEPT VISIT HI MDM: CPT | Mod: FS

## 2024-01-15 PROCEDURE — 71275 CT ANGIOGRAPHY CHEST: CPT | Mod: MA

## 2024-01-15 PROCEDURE — 84132 ASSAY OF SERUM POTASSIUM: CPT

## 2024-01-15 PROCEDURE — 0241U: CPT

## 2024-01-15 PROCEDURE — 85610 PROTHROMBIN TIME: CPT

## 2024-01-15 PROCEDURE — 80053 COMPREHEN METABOLIC PANEL: CPT

## 2024-01-15 PROCEDURE — 96374 THER/PROPH/DIAG INJ IV PUSH: CPT | Mod: XU

## 2024-01-15 PROCEDURE — 99285 EMERGENCY DEPT VISIT HI MDM: CPT | Mod: 25

## 2024-01-15 PROCEDURE — 86901 BLOOD TYPING SEROLOGIC RH(D): CPT

## 2024-01-15 PROCEDURE — 85014 HEMATOCRIT: CPT

## 2024-01-15 PROCEDURE — 83605 ASSAY OF LACTIC ACID: CPT

## 2024-01-15 PROCEDURE — 93010 ELECTROCARDIOGRAM REPORT: CPT

## 2024-01-15 PROCEDURE — 85025 COMPLETE CBC W/AUTO DIFF WBC: CPT

## 2024-01-15 PROCEDURE — 83880 ASSAY OF NATRIURETIC PEPTIDE: CPT

## 2024-01-15 PROCEDURE — 84295 ASSAY OF SERUM SODIUM: CPT

## 2024-01-15 PROCEDURE — 83735 ASSAY OF MAGNESIUM: CPT

## 2024-01-15 PROCEDURE — 85018 HEMOGLOBIN: CPT

## 2024-01-15 PROCEDURE — 86850 RBC ANTIBODY SCREEN: CPT

## 2024-01-15 PROCEDURE — 82803 BLOOD GASES ANY COMBINATION: CPT

## 2024-01-15 RX ORDER — LEVOFLOXACIN 5 MG/ML
1 INJECTION, SOLUTION INTRAVENOUS
Qty: 7 | Refills: 0
Start: 2024-01-15 | End: 2024-01-21

## 2024-01-15 RX ORDER — MAGNESIUM OXIDE 400 MG ORAL TABLET 241.3 MG
400 TABLET ORAL ONCE
Refills: 0 | Status: COMPLETED | OUTPATIENT
Start: 2024-01-15 | End: 2024-01-15

## 2024-01-15 RX ORDER — DEXAMETHASONE 0.5 MG/5ML
10 ELIXIR ORAL ONCE
Refills: 0 | Status: COMPLETED | OUTPATIENT
Start: 2024-01-15 | End: 2024-01-15

## 2024-01-15 RX ADMIN — Medication 10 MILLIGRAM(S): at 13:49

## 2024-01-15 RX ADMIN — MAGNESIUM OXIDE 400 MG ORAL TABLET 400 MILLIGRAM(S): 241.3 TABLET ORAL at 14:52

## 2024-01-15 NOTE — ED PROVIDER NOTE - PROGRESS NOTE DETAILS
Labs unremarkable.  A CT shows pneumonia with no evidence of PE.  Patient is able to ambulate and is stable for discharge.  I will send antibiotics to the pharmacy. Patient is low risk based on curb 65 criteria.

## 2024-01-15 NOTE — ED ADULT NURSE NOTE - NSFALLRISKINTERV_ED_ALL_ED
Assistance with ambulation/Communicate fall risk and risk factors to all staff, patient, and family/Monitor gait and stability/Provide visual cue: yellow wristband, yellow gown, etc/Reinforce activity limits and safety measures with patient and family/Call bell, personal items and telephone in reach/Instruct patient to call for assistance before getting out of bed/chair/stretcher/Non-slip footwear applied when patient is off stretcher/[place] to call system/Physically safe environment - no spills, clutter or unnecessary equipment/Purposeful Proactive Rounding/Room/bathroom lighting operational, light cord in reach Assistance with ambulation/Communicate fall risk and risk factors to all staff, patient, and family/Monitor gait and stability/Provide visual cue: yellow wristband, yellow gown, etc/Reinforce activity limits and safety measures with patient and family/Call bell, personal items and telephone in reach/Instruct patient to call for assistance before getting out of bed/chair/stretcher/Non-slip footwear applied when patient is off stretcher/[place] to call system/Physically safe environment - no spills, clutter or unnecessary equipment/Purposeful Proactive Rounding/Room/bathroom lighting operational, light cord in reach Assistance with ambulation/Communicate fall risk and risk factors to all staff, patient, and family/Monitor gait and stability/Provide visual cue: yellow wristband, yellow gown, etc/Reinforce activity limits and safety measures with patient and family/Call bell, personal items and telephone in reach/Instruct patient to call for assistance before getting out of bed/chair/stretcher/Non-slip footwear applied when patient is off stretcher/[place] to call system/Physically safe environment - no spills, clutter or unnecessary equipment/Purposeful Proactive Rounding/Room/bathroom lighting operational, light cord in reach

## 2024-01-15 NOTE — ED PROVIDER NOTE - ATTENDING CONTRIBUTION TO CARE
see mdm 88-year-old female, past medical history of hypertension, COPD, presenting with worsening shortness of breath for about 2 to 3 weeks, no alleviating or aggravating factors, associated with cough that is been improving.  Denies fevers, chills, chest pain, nausea, vomiting, abdominal pain.  Patient was seen in urgent care and given a few nebulizer treatments.  Patient states that she does feel little bit better now.  Well-appearing on exam.  Normal respiratory effort and lungs clear to auscultation bilaterally.  Regular tachycardia.  No leg swelling or tenderness.  Labs ordered and reviewed.  Imaging ordered and reviewed.    CONSTITUTIONAL: Well-developed; well-nourished; in no acute distress.   SKIN: warm, dry  HEAD: Normocephalic  EYES: no conjunctival erythema  ENT: No nasal discharge; airway clear.  NECK: Supple; non tender.  CARD: S1, S2 normal; Regular rhythm. +tachycardic  RESP: No wheezes, rales or rhonchi.  ABD: soft ntnd  EXT: Normal ROM.  No clubbing, cyanosis or edema.   NEURO: Alert, oriented, grossly unremarkable  PSYCH: Cooperative, appropriate.

## 2024-01-15 NOTE — ED PROVIDER NOTE - NSFOLLOWUPINSTRUCTIONS_ED_ALL_ED_FT
Please make sure to follow up with your primary care doctor in 3 days.    Pneumonia    Pneumonia is an infection of the lungs. Pneumonia may be caused by bacteria, viruses, or funguses. Symptoms include coughing, fever, chest pain when breathing deeply or coughing, shortness of breath, fatigue, or muscle aches. Pneumonia can be diagnosed with a medical history and physical exam, as well as other tests which may include a chest X-ray. If you were prescribed an antibiotic medicine, take it as told by your health care provider and do not stop taking the antibiotic even if you start to feel better. Do not use tobacco products, including cigarettes, chewing tobacco, and e-cigarettes.    SEEK IMMEDIATE MEDICAL CARE IF YOU HAVE THE FOLLOWING SYMPTOMS: worsening shortness of breath, worsening chest pain, coughing up blood, change in mental status, lightheadedness/dizziness.

## 2024-01-15 NOTE — ED PROVIDER NOTE - CLINICAL SUMMARY MEDICAL DECISION MAKING FREE TEXT BOX
88-year-old female, past medical history of hypertension, COPD, presenting with worsening shortness of breath for about 2 to 3 weeks, no alleviating or aggravating factors, associated with cough that is been improving.  Denies fevers, chills, chest pain, nausea, vomiting, abdominal pain.  Patient was seen in urgent care and given a few nebulizer treatments.  Patient states that she does feel little bit better now.  Well-appearing on exam.  Normal respiratory effort and lungs clear to auscultation bilaterally.  Regular tachycardia.  No leg swelling or tenderness.  Labs ordered and reviewed.  Imaging ordered and reviewed. Patient's VSS, labs, EKG, XR, non-diagnostic. CT ? PNA< otherwise normal. Presentation not consistent with acute cardiac etiologies to include ACS (non ischemic ekg, unremarkable trop), CHF, pericardial effusion / tamponade . Presentation not consistent with acute respiratory etiologies to include acute PE, pneumothorax , asthma, COPD exacerbation, allergic etiologies. Presentation also not consistent with non-cardiopulmonary causes to include toxidromes, metabolic etiologies such as acidemia or electrolyte derangements, sepsis, neurologic causes (i.e. demyelinating diseases).    The patient was given detailed return precautions and advised to return to the emergency department if any new symptoms developed, symptoms worsened or for any concerns. The patient was offered the opportunity to ask questions and verbalized that they understand the diagnosis and discharge instructions.

## 2024-01-15 NOTE — ED ADULT NURSE NOTE - OBJECTIVE STATEMENT
Pt c/o shortness of breath w chest tightness x 2 weeks. Pt states she went to urgent care today and was sent to ED. Pt states she it worsens with exertion. Pt breathing unlabored and able to speak in full sentences. Placed on CM w O2 monitoring- O2 97% on RA. Hx COPD

## 2024-01-15 NOTE — ED PROVIDER NOTE - OBJECTIVE STATEMENT
88-year-old female with a past medical history of hypertension and COPD who presented to ED with shortness of breath and cough, and chest discomfort.  Reports that symptoms started about 2 weeks ago; chest discomfort forward is tightness, nonexertional, intermittent, and only occurs after coughing.  Reports that her symptoms are not improving, so decided come to the ED for evaluation.  Denies fever, nausea, vomiting, abdominal pain, urinary symptoms, and change in the bowel movement.

## 2024-01-15 NOTE — ED PROVIDER NOTE - PATIENT PORTAL LINK FT
You can access the FollowMyHealth Patient Portal offered by [facility] by registering at the following website: [email or web address]. By joining [employer]’s FollowMyHealth portal, you will also be able to view your health information using other applications (apps) compatible with our system. You can access the FollowMyHealth Patient Portal offered by [facility] by registering at the following website: [email or web address]. By joining [employer]’s FollowMyHealth portal, you will also be able to view your health information using other applications (apps) compatible with our system. You can access the FollowMyHealth Patient Portal offered by [facility] by registering at the following website: [email or web address]. By joining [employer]’s FollowMyHealth portal, you will also be able to view your health information using other applications (apps) compatible with our system.

## 2024-01-21 ENCOUNTER — NON-APPOINTMENT (OUTPATIENT)
Age: 89
End: 2024-01-21

## 2024-02-03 ENCOUNTER — EMERGENCY (EMERGENCY)
Facility: HOSPITAL | Age: 89
LOS: 0 days | Discharge: HOME | End: 2024-02-03
Attending: EMERGENCY MEDICINE
Payer: MEDICARE

## 2024-02-03 VITALS
RESPIRATION RATE: 20 BRPM | HEART RATE: 90 BPM | TEMPERATURE: 98 F | WEIGHT: 102.07 LBS | OXYGEN SATURATION: 96 % | DIASTOLIC BLOOD PRESSURE: 79 MMHG | HEIGHT: 62 IN | SYSTOLIC BLOOD PRESSURE: 181 MMHG

## 2024-02-03 DIAGNOSIS — Z20.822 CONTACT WITH AND (SUSPECTED) EXPOSURE TO COVID-19: ICD-10-CM

## 2024-02-03 DIAGNOSIS — Z87.01 PERSONAL HISTORY OF PNEUMONIA (RECURRENT): ICD-10-CM

## 2024-02-03 DIAGNOSIS — Z86.16 PERSONAL HISTORY OF COVID-19: ICD-10-CM

## 2024-02-03 DIAGNOSIS — R06.02 SHORTNESS OF BREATH: ICD-10-CM

## 2024-02-03 DIAGNOSIS — J44.1 CHRONIC OBSTRUCTIVE PULMONARY DISEASE WITH (ACUTE) EXACERBATION: ICD-10-CM

## 2024-02-03 DIAGNOSIS — R06.2 WHEEZING: ICD-10-CM

## 2024-02-03 DIAGNOSIS — I10 ESSENTIAL (PRIMARY) HYPERTENSION: ICD-10-CM

## 2024-02-03 LAB
ALBUMIN SERPL ELPH-MCNC: 4.1 G/DL — SIGNIFICANT CHANGE UP (ref 3.5–5.2)
ALP SERPL-CCNC: 105 U/L — SIGNIFICANT CHANGE UP (ref 30–115)
ALT FLD-CCNC: 15 U/L — SIGNIFICANT CHANGE UP (ref 0–41)
ANION GAP SERPL CALC-SCNC: 13 MMOL/L — SIGNIFICANT CHANGE UP (ref 7–14)
APTT BLD: 32.8 SEC — SIGNIFICANT CHANGE UP (ref 27–39.2)
APTT BLD: 33.3 SEC — SIGNIFICANT CHANGE UP (ref 27–39.2)
AST SERPL-CCNC: 21 U/L — SIGNIFICANT CHANGE UP (ref 0–41)
BASE EXCESS BLDV CALC-SCNC: 4.9 MMOL/L — HIGH (ref -2–3)
BASOPHILS # BLD AUTO: 0.06 K/UL — SIGNIFICANT CHANGE UP (ref 0–0.2)
BASOPHILS NFR BLD AUTO: 0.9 % — SIGNIFICANT CHANGE UP (ref 0–1)
BILIRUB SERPL-MCNC: <0.2 MG/DL — SIGNIFICANT CHANGE UP (ref 0.2–1.2)
BUN SERPL-MCNC: 12 MG/DL — SIGNIFICANT CHANGE UP (ref 10–20)
CA-I SERPL-SCNC: 1.24 MMOL/L — SIGNIFICANT CHANGE UP (ref 1.15–1.33)
CALCIUM SERPL-MCNC: 9.6 MG/DL — SIGNIFICANT CHANGE UP (ref 8.4–10.4)
CHLORIDE SERPL-SCNC: 96 MMOL/L — LOW (ref 98–110)
CO2 SERPL-SCNC: 26 MMOL/L — SIGNIFICANT CHANGE UP (ref 17–32)
CREAT SERPL-MCNC: 0.9 MG/DL — SIGNIFICANT CHANGE UP (ref 0.7–1.5)
D DIMER BLD IA.RAPID-MCNC: 265 NG/ML DDU — HIGH
EGFR: 62 ML/MIN/1.73M2 — SIGNIFICANT CHANGE UP
EOSINOPHIL # BLD AUTO: 0.09 K/UL — SIGNIFICANT CHANGE UP (ref 0–0.7)
EOSINOPHIL NFR BLD AUTO: 1.3 % — SIGNIFICANT CHANGE UP (ref 0–8)
FLUAV AG NPH QL: SIGNIFICANT CHANGE UP
FLUBV AG NPH QL: SIGNIFICANT CHANGE UP
GAS PNL BLDV: 129 MMOL/L — LOW (ref 136–145)
GAS PNL BLDV: SIGNIFICANT CHANGE UP
GAS PNL BLDV: SIGNIFICANT CHANGE UP
GLUCOSE SERPL-MCNC: 106 MG/DL — HIGH (ref 70–99)
HCO3 BLDV-SCNC: 31 MMOL/L — HIGH (ref 22–29)
HCT VFR BLD CALC: 37.5 % — SIGNIFICANT CHANGE UP (ref 37–47)
HCT VFR BLDA CALC: 56 % — HIGH (ref 34.5–46.5)
HGB BLD CALC-MCNC: 18.7 G/DL — HIGH (ref 11.7–16.1)
HGB BLD-MCNC: 13 G/DL — SIGNIFICANT CHANGE UP (ref 12–16)
IMM GRANULOCYTES NFR BLD AUTO: 0.3 % — SIGNIFICANT CHANGE UP (ref 0.1–0.3)
INR BLD: 1 RATIO — SIGNIFICANT CHANGE UP (ref 0.65–1.3)
LACTATE BLDV-MCNC: 1.2 MMOL/L — SIGNIFICANT CHANGE UP (ref 0.5–2)
LYMPHOCYTES # BLD AUTO: 1.27 K/UL — SIGNIFICANT CHANGE UP (ref 1.2–3.4)
LYMPHOCYTES # BLD AUTO: 18.1 % — LOW (ref 20.5–51.1)
MAGNESIUM SERPL-MCNC: 1.8 MG/DL — SIGNIFICANT CHANGE UP (ref 1.8–2.4)
MCHC RBC-ENTMCNC: 30.6 PG — SIGNIFICANT CHANGE UP (ref 27–31)
MCHC RBC-ENTMCNC: 34.7 G/DL — SIGNIFICANT CHANGE UP (ref 32–37)
MCV RBC AUTO: 88.2 FL — SIGNIFICANT CHANGE UP (ref 81–99)
MONOCYTES # BLD AUTO: 0.79 K/UL — HIGH (ref 0.1–0.6)
MONOCYTES NFR BLD AUTO: 11.3 % — HIGH (ref 1.7–9.3)
NEUTROPHILS # BLD AUTO: 4.79 K/UL — SIGNIFICANT CHANGE UP (ref 1.4–6.5)
NEUTROPHILS NFR BLD AUTO: 68.1 % — SIGNIFICANT CHANGE UP (ref 42.2–75.2)
NRBC # BLD: 0 /100 WBCS — SIGNIFICANT CHANGE UP (ref 0–0)
NT-PROBNP SERPL-SCNC: 324 PG/ML — HIGH (ref 0–300)
PCO2 BLDV: 49 MMHG — HIGH (ref 39–42)
PH BLDV: 7.41 — SIGNIFICANT CHANGE UP (ref 7.32–7.43)
PLATELET # BLD AUTO: 394 K/UL — SIGNIFICANT CHANGE UP (ref 130–400)
PMV BLD: 8.7 FL — SIGNIFICANT CHANGE UP (ref 7.4–10.4)
PO2 BLDV: 25 MMHG — SIGNIFICANT CHANGE UP (ref 25–45)
POTASSIUM BLDV-SCNC: 4.2 MMOL/L — SIGNIFICANT CHANGE UP (ref 3.5–5.1)
POTASSIUM SERPL-MCNC: 4.5 MMOL/L — SIGNIFICANT CHANGE UP (ref 3.5–5)
POTASSIUM SERPL-SCNC: 4.5 MMOL/L — SIGNIFICANT CHANGE UP (ref 3.5–5)
PROT SERPL-MCNC: 6.6 G/DL — SIGNIFICANT CHANGE UP (ref 6–8)
PROTHROM AB SERPL-ACNC: 11.4 SEC — SIGNIFICANT CHANGE UP (ref 9.95–12.87)
RBC # BLD: 4.25 M/UL — SIGNIFICANT CHANGE UP (ref 4.2–5.4)
RBC # FLD: 13.8 % — SIGNIFICANT CHANGE UP (ref 11.5–14.5)
RSV RNA NPH QL NAA+NON-PROBE: SIGNIFICANT CHANGE UP
SAO2 % BLDV: 33.6 % — LOW (ref 67–88)
SARS-COV-2 RNA SPEC QL NAA+PROBE: SIGNIFICANT CHANGE UP
SODIUM SERPL-SCNC: 135 MMOL/L — SIGNIFICANT CHANGE UP (ref 135–146)
TROPONIN T, HIGH SENSITIVITY RESULT: 12 NG/L — SIGNIFICANT CHANGE UP (ref 6–13)
WBC # BLD: 7.02 K/UL — SIGNIFICANT CHANGE UP (ref 4.8–10.8)
WBC # FLD AUTO: 7.02 K/UL — SIGNIFICANT CHANGE UP (ref 4.8–10.8)

## 2024-02-03 PROCEDURE — 84295 ASSAY OF SERUM SODIUM: CPT

## 2024-02-03 PROCEDURE — 93005 ELECTROCARDIOGRAM TRACING: CPT

## 2024-02-03 PROCEDURE — 71045 X-RAY EXAM CHEST 1 VIEW: CPT | Mod: 26

## 2024-02-03 PROCEDURE — 94640 AIRWAY INHALATION TREATMENT: CPT

## 2024-02-03 PROCEDURE — 85730 THROMBOPLASTIN TIME PARTIAL: CPT

## 2024-02-03 PROCEDURE — 83605 ASSAY OF LACTIC ACID: CPT

## 2024-02-03 PROCEDURE — 80053 COMPREHEN METABOLIC PANEL: CPT

## 2024-02-03 PROCEDURE — 83735 ASSAY OF MAGNESIUM: CPT

## 2024-02-03 PROCEDURE — 83880 ASSAY OF NATRIURETIC PEPTIDE: CPT

## 2024-02-03 PROCEDURE — 85025 COMPLETE CBC W/AUTO DIFF WBC: CPT

## 2024-02-03 PROCEDURE — 82803 BLOOD GASES ANY COMBINATION: CPT

## 2024-02-03 PROCEDURE — 85014 HEMATOCRIT: CPT

## 2024-02-03 PROCEDURE — 84132 ASSAY OF SERUM POTASSIUM: CPT

## 2024-02-03 PROCEDURE — 84484 ASSAY OF TROPONIN QUANT: CPT

## 2024-02-03 PROCEDURE — 96374 THER/PROPH/DIAG INJ IV PUSH: CPT

## 2024-02-03 PROCEDURE — 71045 X-RAY EXAM CHEST 1 VIEW: CPT

## 2024-02-03 PROCEDURE — 85018 HEMOGLOBIN: CPT

## 2024-02-03 PROCEDURE — 85610 PROTHROMBIN TIME: CPT

## 2024-02-03 PROCEDURE — 36415 COLL VENOUS BLD VENIPUNCTURE: CPT

## 2024-02-03 PROCEDURE — 99284 EMERGENCY DEPT VISIT MOD MDM: CPT | Mod: FS

## 2024-02-03 PROCEDURE — 82330 ASSAY OF CALCIUM: CPT

## 2024-02-03 PROCEDURE — 93010 ELECTROCARDIOGRAM REPORT: CPT

## 2024-02-03 PROCEDURE — 0241U: CPT

## 2024-02-03 PROCEDURE — 99285 EMERGENCY DEPT VISIT HI MDM: CPT | Mod: 25

## 2024-02-03 PROCEDURE — 85379 FIBRIN DEGRADATION QUANT: CPT

## 2024-02-03 RX ORDER — IPRATROPIUM/ALBUTEROL SULFATE 18-103MCG
3 AEROSOL WITH ADAPTER (GRAM) INHALATION
Refills: 0 | Status: COMPLETED | OUTPATIENT
Start: 2024-02-03 | End: 2024-02-03

## 2024-02-03 RX ADMIN — Medication 3 MILLILITER(S): at 19:37

## 2024-02-03 RX ADMIN — Medication 125 MILLIGRAM(S): at 19:39

## 2024-02-03 RX ADMIN — Medication 3 MILLILITER(S): at 19:42

## 2024-02-03 RX ADMIN — Medication 3 MILLILITER(S): at 19:59

## 2024-02-03 NOTE — ED PROVIDER NOTE - CLINICAL SUMMARY MEDICAL DECISION MAKING FREE TEXT BOX
Pt with sob and wheezing, likely copd exacerbation, feels better, was offered admission due to risk factors, but refused.  pt says she just wants to try treatment at home.  no hypoxia in ED.  Pt was given strict return to ED precautions and pt indicated that he/she understood. Any ordered labs and EKG were reviewed, Dr. Roxana Espitia, attending physician.  Any imaging was ordered and reviewed by me, Dr. Roxana Espitia, attending physician.  Appropriate medications for patient's presenting complaints were ordered and effects were reassessed.  Patient's records, if available,  (prior hospital, ED visit, and/or nursing home notes if available) were reviewed.  Additional history was obtained from EMS, family, and/or PCP (when available).  Escalation to admission/observation was considered.  1) However patient feels much better and is comfortable with discharge.  Appropriate follow-up was arranged.

## 2024-02-03 NOTE — ED PROVIDER NOTE - PATIENT PORTAL LINK FT
You can access the FollowMyHealth Patient Portal offered by [facility] by registering at the following website: [email or web address]. By joining [employer]’s FollowMyHealth portal, you will also be able to view your health information using other applications (apps) compatible with our system.

## 2024-02-03 NOTE — ED ADULT NURSE NOTE - NSFALLUNIVINTERV_ED_ALL_ED
Bed/Stretcher in lowest position, wheels locked, appropriate side rails in place/Call bell, personal items and telephone in reach/Instruct patient to call for assistance before getting out of bed/chair/stretcher/Non-slip footwear applied when patient is off stretcher/[place] to call system/Physically safe environment - no spills, clutter or unnecessary equipment/Purposeful proactive rounding/Room/bathroom lighting operational, light cord in reach

## 2024-02-03 NOTE — ED ADULT NURSE NOTE - NSICDXPASTMEDICALHX_GEN_ALL_CORE_FT
Detail Level: Simple Render Risk Assessment In Note?: no Additional Notes: Reduction in bsa. Doing well on therapy PAST MEDICAL HISTORY:  COPD (chronic obstructive pulmonary disease)

## 2024-02-03 NOTE — ED ADULT NURSE NOTE - NSFALLCONCLUSION_ED_ALL_ED
Telephone Encounter by Tala Johnson RN at 06/01/18 04:56 PM     Author:  Tala Johnson, RN Service:  (none) Author Type:  Registered Nurse     Filed:  06/01/18 05:00 PM Encounter Date:  6/1/2018 Status:  Signed     :  Tala Johnson RN (Registered Nurse)            Orthopedic order dated 6/01[JB1.1M]    Associated Diagnoses         Arthralgia of left wrist [M25.532]  - Primary                Order Questions         Question Answer Comment     Time frame to be seen Next available      Problem to be seen for? chronic wrist pain[JB1.1C]          Xray is currently in progress      Called patient, not in.[JB1.1M]  Left message on answering machine to call back.[JB1.1T]  Would schedule appointment based on xray result[JB1.1M]      Revision History        User Key Date/Time User Provider Type Action    > JB1.1 06/01/18 05:00 PM Tala Johnson, RN Registered Nurse Sign    C - Copied, M - Manual, T - Template             Universal Safety Interventions

## 2024-02-03 NOTE — ED PROVIDER NOTE - PROGRESS NOTE DETAILS
JS: Patient feeling better upon reassessment after nebulizer and steroids.  Ambulatory sat steady between 94 and 95% on room air.  Shared decision making used, patient would prefer to be discharged with short course of steroids and follow-up with her pulmonologist as an outpatient.  Viral swab negative labs within normal limits.  Patient vitally stable will discharge with strict return precautions.

## 2024-02-03 NOTE — ED PROVIDER NOTE - OBJECTIVE STATEMENT
88-year-old female with a past medical history of COPD, hypertension presents to the ED complaining of shortness of breath.  Patient with several weeks of shortness of breath acutely worsening over the last week.  Has tried using her at home inhaler and nebulizer with minimal improvement.  Patient reports that approximately a month ago she was diagnosed with COVID which then progressed to pneumonia and since then has been struggling with her breathing.  Follows with pulmonologist Dr. Ronald Webb.  Denies any fever, cough, chest pain, nausea, vomiting, diarrhea, palpitations.

## 2024-02-03 NOTE — ED PROVIDER NOTE - ATTENDING APP SHARED VISIT CONTRIBUTION OF CARE
89 yo f with pmh of htn, copd, presents with c/o sob.  using her inhaler/nebulizer with transient relief.  pt had recent covid and pna last month.  f/u with dr. janelle irizarry.  pt denies cp.  no leg pain or swelling.  pt denies fever or chills.  exam: nad, ncat, perrl, eomi, mmm, rrr, mild basilar wheezing, , abd soft, nt, nd aox3, no calf tenderness, no pitting edema imp: pt with copd exacerbation, will tx symptomatically.  will check labs and cxr

## 2024-02-07 ENCOUNTER — APPOINTMENT (OUTPATIENT)
Dept: PULMONOLOGY | Facility: CLINIC | Age: 89
End: 2024-02-07
Payer: MEDICARE

## 2024-02-07 PROCEDURE — 99442: CPT | Mod: 93

## 2024-02-07 RX ORDER — NYSTATIN 100000 [USP'U]/ML
100000 SUSPENSION ORAL 3 TIMES DAILY
Qty: 450 | Refills: 0 | Status: ACTIVE | COMMUNITY
Start: 2024-02-07 | End: 1900-01-01

## 2024-02-07 RX ORDER — PREDNISONE 20 MG/1
20 TABLET ORAL
Qty: 15 | Refills: 0 | Status: ACTIVE | COMMUNITY
Start: 2024-02-07 | End: 1900-01-01

## 2024-02-07 NOTE — REASON FOR VISIT
[Home] : at home, [unfilled] , at the time of the visit. [Medical Office: ([facility] [place])___] : at the medical office located in  [This encounter was initiated by telehealth (audio with video) and converted to telephone (audio only) due to technical difficulties.] : This encounter was initiated by telehealth (audio with video) and converted to telephone (audio only) due to technical difficulties. [Follow-Up] : a follow-up visit [COPD] : COPD [Shortness of Breath] : shortness of breath

## 2024-02-07 NOTE — DISCUSSION/SUMMARY
[FreeTextEntry1] : COPD EXACERBATION DUE TO COVID SP MULTIPLE COURSE OF STEROIDS SHORT COURSE OF PREDNISONE ADVAIR TWICE REFER TO PUL REHAB

## 2024-02-23 RX ORDER — MOMETASONE 50 UG/1
50 SPRAY, METERED NASAL DAILY
Qty: 1 | Refills: 4 | Status: ACTIVE | COMMUNITY
Start: 2023-05-02 | End: 1900-01-01

## 2024-02-27 ENCOUNTER — OUTPATIENT (OUTPATIENT)
Dept: OUTPATIENT SERVICES | Facility: HOSPITAL | Age: 89
LOS: 1 days | End: 2024-02-27
Payer: MEDICARE

## 2024-02-27 ENCOUNTER — APPOINTMENT (OUTPATIENT)
Age: 89
End: 2024-02-27

## 2024-02-27 DIAGNOSIS — J44.9 CHRONIC OBSTRUCTIVE PULMONARY DISEASE, UNSPECIFIED: ICD-10-CM

## 2024-02-27 PROCEDURE — 94626 PHY/QHP OP PULM RHB W/MNTR: CPT

## 2024-02-28 DIAGNOSIS — J44.9 CHRONIC OBSTRUCTIVE PULMONARY DISEASE, UNSPECIFIED: ICD-10-CM

## 2024-02-29 ENCOUNTER — APPOINTMENT (OUTPATIENT)
Age: 89
End: 2024-02-29

## 2024-03-04 ENCOUNTER — APPOINTMENT (OUTPATIENT)
Dept: PULMONOLOGY | Facility: CLINIC | Age: 89
End: 2024-03-04

## 2024-03-05 ENCOUNTER — APPOINTMENT (OUTPATIENT)
Age: 89
End: 2024-03-05

## 2024-03-07 ENCOUNTER — APPOINTMENT (OUTPATIENT)
Age: 89
End: 2024-03-07

## 2024-03-12 ENCOUNTER — APPOINTMENT (OUTPATIENT)
Age: 89
End: 2024-03-12

## 2024-03-14 ENCOUNTER — APPOINTMENT (OUTPATIENT)
Age: 89
End: 2024-03-14

## 2024-03-14 ENCOUNTER — APPOINTMENT (OUTPATIENT)
Dept: PULMONOLOGY | Facility: CLINIC | Age: 89
End: 2024-03-14
Payer: MEDICARE

## 2024-03-14 VITALS
DIASTOLIC BLOOD PRESSURE: 66 MMHG | OXYGEN SATURATION: 88 % | HEART RATE: 89 BPM | WEIGHT: 104 LBS | BODY MASS INDEX: 19.14 KG/M2 | HEIGHT: 62 IN | SYSTOLIC BLOOD PRESSURE: 130 MMHG

## 2024-03-14 VITALS
BODY MASS INDEX: 19.14 KG/M2 | OXYGEN SATURATION: 97 % | WEIGHT: 104 LBS | DIASTOLIC BLOOD PRESSURE: 66 MMHG | HEART RATE: 89 BPM | HEIGHT: 62 IN | SYSTOLIC BLOOD PRESSURE: 130 MMHG

## 2024-03-14 DIAGNOSIS — R06.02 SHORTNESS OF BREATH: ICD-10-CM

## 2024-03-14 DIAGNOSIS — J44.9 CHRONIC OBSTRUCTIVE PULMONARY DISEASE, UNSPECIFIED: ICD-10-CM

## 2024-03-14 PROCEDURE — 99213 OFFICE O/P EST LOW 20 MIN: CPT

## 2024-03-14 PROCEDURE — G2211 COMPLEX E/M VISIT ADD ON: CPT

## 2024-03-14 NOTE — DISCUSSION/SUMMARY
[FreeTextEntry1] : COPD/ EMPHYSEMA/ ADVAIR HER POX WAS 88% RA AT REST, PATIENT MOBILE AT HOME WILL ORDER INOGEN LIKE OXYGEN CHEST CT/ PFT REVIEWED PUL REHAB

## 2024-03-15 RX ORDER — FLUTICASONE FUROATE, UMECLIDINIUM BROMIDE AND VILANTEROL TRIFENATATE 100; 62.5; 25 UG/1; UG/1; UG/1
100-62.5-25 POWDER RESPIRATORY (INHALATION)
Qty: 60 | Refills: 5 | Status: ACTIVE | COMMUNITY
Start: 2024-03-15 | End: 1900-01-01

## 2024-03-15 RX ORDER — FLUTICASONE FUROATE, UMECLIDINIUM BROMIDE AND VILANTEROL TRIFENATATE 200; 62.5; 25 UG/1; UG/1; UG/1
200-62.5-25 POWDER RESPIRATORY (INHALATION)
Refills: 0 | Status: ACTIVE | COMMUNITY

## 2024-03-19 ENCOUNTER — APPOINTMENT (OUTPATIENT)
Age: 89
End: 2024-03-19

## 2024-03-21 ENCOUNTER — APPOINTMENT (OUTPATIENT)
Age: 89
End: 2024-03-21

## 2024-03-25 NOTE — ED PROVIDER NOTE - PHYSICAL EXAMINATION
[] : Left CONSTITUTIONAL: well-appearing, in NAD  SKIN: Warm dry, normal skin turgor  HEAD: NCAT  EYES: EOMI, PERRLA, no scleral icterus, conjunctiva pink  ENT: normal pharynx with no erythema or exudates  NECK: Supple; non tender. Full ROM.  CARD: RRR, no murmurs.  RESP: clear to ausculation b/l. No crackles or wheezing.  ABD: soft, tender to palpation in upper quadrants and epigastrium, non-distended, no rebound or guarding. no CVA tenderness  EXT: Full ROM, no bony tenderness, no pedal edema, no calf tenderness  NEURO: normal motor. normal sensory. Normal gait.  PSYCH: Cooperative, appropriate.

## 2024-03-26 ENCOUNTER — APPOINTMENT (OUTPATIENT)
Age: 89
End: 2024-03-26

## 2024-03-28 ENCOUNTER — APPOINTMENT (OUTPATIENT)
Age: 89
End: 2024-03-28

## 2024-04-02 ENCOUNTER — APPOINTMENT (OUTPATIENT)
Age: 89
End: 2024-04-02

## 2024-04-04 ENCOUNTER — APPOINTMENT (OUTPATIENT)
Age: 89
End: 2024-04-04

## 2024-04-09 ENCOUNTER — APPOINTMENT (OUTPATIENT)
Age: 89
End: 2024-04-09

## 2024-04-11 ENCOUNTER — APPOINTMENT (OUTPATIENT)
Age: 89
End: 2024-04-11

## 2024-04-16 ENCOUNTER — APPOINTMENT (OUTPATIENT)
Age: 89
End: 2024-04-16

## 2024-04-18 ENCOUNTER — APPOINTMENT (OUTPATIENT)
Age: 89
End: 2024-04-18

## 2024-04-23 ENCOUNTER — APPOINTMENT (OUTPATIENT)
Age: 89
End: 2024-04-23

## 2024-04-25 ENCOUNTER — APPOINTMENT (OUTPATIENT)
Age: 89
End: 2024-04-25

## 2024-04-30 ENCOUNTER — APPOINTMENT (OUTPATIENT)
Age: 89
End: 2024-04-30

## 2024-05-02 ENCOUNTER — APPOINTMENT (OUTPATIENT)
Age: 89
End: 2024-05-02

## 2024-05-07 ENCOUNTER — APPOINTMENT (OUTPATIENT)
Age: 89
End: 2024-05-07

## 2024-05-09 ENCOUNTER — APPOINTMENT (OUTPATIENT)
Age: 89
End: 2024-05-09

## 2024-05-14 ENCOUNTER — APPOINTMENT (OUTPATIENT)
Age: 89
End: 2024-05-14

## 2024-05-16 ENCOUNTER — APPOINTMENT (OUTPATIENT)
Age: 89
End: 2024-05-16

## 2024-05-21 ENCOUNTER — APPOINTMENT (OUTPATIENT)
Age: 89
End: 2024-05-21

## 2024-05-23 ENCOUNTER — APPOINTMENT (OUTPATIENT)
Age: 89
End: 2024-05-23

## 2024-05-28 ENCOUNTER — APPOINTMENT (OUTPATIENT)
Age: 89
End: 2024-05-28

## 2024-05-30 ENCOUNTER — APPOINTMENT (OUTPATIENT)
Age: 89
End: 2024-05-30

## 2024-06-04 ENCOUNTER — APPOINTMENT (OUTPATIENT)
Age: 89
End: 2024-06-04

## 2024-06-06 ENCOUNTER — APPOINTMENT (OUTPATIENT)
Age: 89
End: 2024-06-06

## 2024-06-11 ENCOUNTER — APPOINTMENT (OUTPATIENT)
Age: 89
End: 2024-06-11

## 2024-06-13 ENCOUNTER — APPOINTMENT (OUTPATIENT)
Age: 89
End: 2024-06-13

## 2024-06-18 ENCOUNTER — APPOINTMENT (OUTPATIENT)
Age: 89
End: 2024-06-18

## 2024-06-20 ENCOUNTER — APPOINTMENT (OUTPATIENT)
Age: 89
End: 2024-06-20

## 2024-06-25 ENCOUNTER — APPOINTMENT (OUTPATIENT)
Age: 89
End: 2024-06-25

## 2024-06-27 ENCOUNTER — APPOINTMENT (OUTPATIENT)
Age: 89
End: 2024-06-27

## 2024-07-02 ENCOUNTER — APPOINTMENT (OUTPATIENT)
Age: 89
End: 2024-07-02

## 2024-07-09 ENCOUNTER — APPOINTMENT (OUTPATIENT)
Age: 89
End: 2024-07-09

## 2024-07-11 ENCOUNTER — APPOINTMENT (OUTPATIENT)
Age: 89
End: 2024-07-11

## 2024-10-23 ENCOUNTER — RX RENEWAL (OUTPATIENT)
Age: 89
End: 2024-10-23

## 2024-10-31 ENCOUNTER — APPOINTMENT (OUTPATIENT)
Dept: PULMONOLOGY | Facility: CLINIC | Age: 89
End: 2024-10-31

## 2024-11-19 NOTE — ED PROVIDER NOTE - INPATIENT RESIDENT/ACP NOTIFIED DATE
21-Jan-2020 11:15

## 2024-12-19 ENCOUNTER — NON-APPOINTMENT (OUTPATIENT)
Age: 89
End: 2024-12-19

## 2024-12-21 ENCOUNTER — OUTPATIENT (OUTPATIENT)
Dept: OUTPATIENT SERVICES | Facility: HOSPITAL | Age: 89
LOS: 1 days | End: 2024-12-21
Payer: MEDICARE

## 2024-12-21 DIAGNOSIS — M54.9 DORSALGIA, UNSPECIFIED: ICD-10-CM

## 2024-12-21 DIAGNOSIS — M54.2 CERVICALGIA: ICD-10-CM

## 2024-12-21 PROCEDURE — 72050 X-RAY EXAM NECK SPINE 4/5VWS: CPT | Mod: 26

## 2024-12-21 PROCEDURE — 71046 X-RAY EXAM CHEST 2 VIEWS: CPT | Mod: 26

## 2024-12-21 PROCEDURE — 72050 X-RAY EXAM NECK SPINE 4/5VWS: CPT

## 2024-12-21 PROCEDURE — 71046 X-RAY EXAM CHEST 2 VIEWS: CPT

## 2024-12-22 DIAGNOSIS — M54.2 CERVICALGIA: ICD-10-CM

## 2024-12-22 DIAGNOSIS — M54.9 DORSALGIA, UNSPECIFIED: ICD-10-CM

## 2025-01-03 ENCOUNTER — APPOINTMENT (OUTPATIENT)
Dept: ORTHOPEDIC SURGERY | Facility: CLINIC | Age: 89
End: 2025-01-03
Payer: MEDICARE

## 2025-01-03 DIAGNOSIS — S13.9XXA SPRAIN OF JOINTS AND LIGAMENTS OF UNSPECIFIED PARTS OF NECK, INITIAL ENCOUNTER: ICD-10-CM

## 2025-01-03 DIAGNOSIS — S33.5XXA SPRAIN OF LIGAMENTS OF LUMBAR SPINE, INITIAL ENCOUNTER: ICD-10-CM

## 2025-01-03 PROCEDURE — 99203 OFFICE O/P NEW LOW 30 MIN: CPT

## 2025-01-03 PROCEDURE — 72110 X-RAY EXAM L-2 SPINE 4/>VWS: CPT

## 2025-03-25 NOTE — ED ADULT TRIAGE NOTE - CHIEF COMPLAINT QUOTE
Increasing SOB since Wednesday, feeling fatigued Photo Preface (Leave Blank If You Do Not Want): Photographs were obtained today: Detail Level: Zone

## 2025-04-24 ENCOUNTER — RX RENEWAL (OUTPATIENT)
Age: 89
End: 2025-04-24

## 2025-05-22 ENCOUNTER — OUTPATIENT (OUTPATIENT)
Dept: OUTPATIENT SERVICES | Facility: HOSPITAL | Age: 89
LOS: 1 days | End: 2025-05-22
Payer: MEDICARE

## 2025-05-22 DIAGNOSIS — M54.2 CERVICALGIA: ICD-10-CM

## 2025-05-22 DIAGNOSIS — Z00.8 ENCOUNTER FOR OTHER GENERAL EXAMINATION: ICD-10-CM

## 2025-05-22 PROCEDURE — 72141 MRI NECK SPINE W/O DYE: CPT | Mod: 26

## 2025-05-22 PROCEDURE — 72040 X-RAY EXAM NECK SPINE 2-3 VW: CPT | Mod: 26

## 2025-05-22 PROCEDURE — 72040 X-RAY EXAM NECK SPINE 2-3 VW: CPT

## 2025-05-22 PROCEDURE — 72141 MRI NECK SPINE W/O DYE: CPT

## 2025-05-23 DIAGNOSIS — M54.2 CERVICALGIA: ICD-10-CM

## 2025-06-23 ENCOUNTER — APPOINTMENT (OUTPATIENT)
Age: 89
End: 2025-06-23

## 2025-08-08 ENCOUNTER — INPATIENT (INPATIENT)
Facility: HOSPITAL | Age: 89
LOS: 0 days | Discharge: HOME | DRG: 312 | End: 2025-08-09
Attending: HOSPITALIST | Admitting: STUDENT IN AN ORGANIZED HEALTH CARE EDUCATION/TRAINING PROGRAM
Payer: MEDICARE

## 2025-08-08 VITALS
SYSTOLIC BLOOD PRESSURE: 160 MMHG | HEART RATE: 94 BPM | OXYGEN SATURATION: 99 % | HEIGHT: 62 IN | DIASTOLIC BLOOD PRESSURE: 83 MMHG | RESPIRATION RATE: 16 BRPM | WEIGHT: 104.94 LBS | TEMPERATURE: 98 F

## 2025-08-08 DIAGNOSIS — E87.5 HYPERKALEMIA: ICD-10-CM

## 2025-08-08 LAB
ALBUMIN SERPL ELPH-MCNC: 4.7 G/DL — SIGNIFICANT CHANGE UP (ref 3.5–5.2)
ALP SERPL-CCNC: 116 U/L — HIGH (ref 30–115)
ALT FLD-CCNC: 26 U/L — SIGNIFICANT CHANGE UP (ref 0–41)
ANION GAP SERPL CALC-SCNC: 17 MMOL/L — HIGH (ref 7–14)
APPEARANCE UR: CLEAR — SIGNIFICANT CHANGE UP
AST SERPL-CCNC: 43 U/L — HIGH (ref 0–41)
BASE EXCESS BLDV CALC-SCNC: 2.5 MMOL/L — SIGNIFICANT CHANGE UP (ref -2–3)
BASOPHILS # BLD AUTO: 0.05 K/UL — SIGNIFICANT CHANGE UP (ref 0–0.2)
BASOPHILS NFR BLD AUTO: 0.4 % — SIGNIFICANT CHANGE UP (ref 0–1)
BILIRUB SERPL-MCNC: 0.3 MG/DL — SIGNIFICANT CHANGE UP (ref 0.2–1.2)
BILIRUB UR-MCNC: NEGATIVE — SIGNIFICANT CHANGE UP
BUN SERPL-MCNC: 14 MG/DL — SIGNIFICANT CHANGE UP (ref 10–20)
CA-I SERPL-SCNC: 1.09 MMOL/L — LOW (ref 1.15–1.33)
CA-I SERPL-SCNC: 1.23 MMOL/L — SIGNIFICANT CHANGE UP (ref 1.15–1.33)
CALCIUM SERPL-MCNC: 9.9 MG/DL — SIGNIFICANT CHANGE UP (ref 8.4–10.5)
CHLORIDE SERPL-SCNC: 92 MMOL/L — LOW (ref 98–110)
CO2 SERPL-SCNC: 19 MMOL/L — SIGNIFICANT CHANGE UP (ref 17–32)
COLOR SPEC: YELLOW — SIGNIFICANT CHANGE UP
CREAT SERPL-MCNC: 0.7 MG/DL — SIGNIFICANT CHANGE UP (ref 0.7–1.5)
DIFF PNL FLD: NEGATIVE — SIGNIFICANT CHANGE UP
EGFR: 83 ML/MIN/1.73M2 — SIGNIFICANT CHANGE UP
EGFR: 83 ML/MIN/1.73M2 — SIGNIFICANT CHANGE UP
EOSINOPHIL # BLD AUTO: 0.19 K/UL — SIGNIFICANT CHANGE UP (ref 0–0.7)
EOSINOPHIL NFR BLD AUTO: 1.6 % — SIGNIFICANT CHANGE UP (ref 0–8)
GAS PNL BLDV: 126 MMOL/L — LOW (ref 136–145)
GAS PNL BLDV: 130 MMOL/L — LOW (ref 136–145)
GAS PNL BLDV: SIGNIFICANT CHANGE UP
GLUCOSE SERPL-MCNC: 114 MG/DL — HIGH (ref 70–99)
GLUCOSE UR QL: NEGATIVE MG/DL — SIGNIFICANT CHANGE UP
HCO3 BLDV-SCNC: 25 MMOL/L — SIGNIFICANT CHANGE UP (ref 22–29)
HCO3 BLDV-SCNC: 28 MMOL/L — SIGNIFICANT CHANGE UP (ref 22–29)
HCT VFR BLD CALC: 37.5 % — SIGNIFICANT CHANGE UP (ref 37–47)
HCT VFR BLDA CALC: 39 % — SIGNIFICANT CHANGE UP (ref 34.5–46.5)
HGB BLD CALC-MCNC: 12.9 G/DL — SIGNIFICANT CHANGE UP (ref 11.7–16.1)
HGB BLD-MCNC: 13 G/DL — SIGNIFICANT CHANGE UP (ref 12–16)
IMM GRANULOCYTES NFR BLD AUTO: 0.4 % — HIGH (ref 0.1–0.3)
KETONES UR QL: NEGATIVE MG/DL — SIGNIFICANT CHANGE UP
LACTATE BLDV-MCNC: 1.9 MMOL/L — SIGNIFICANT CHANGE UP (ref 0.5–2)
LACTATE BLDV-MCNC: 1.9 MMOL/L — SIGNIFICANT CHANGE UP (ref 0.5–2)
LEUKOCYTE ESTERASE UR-ACNC: NEGATIVE — SIGNIFICANT CHANGE UP
LYMPHOCYTES # BLD AUTO: 1.63 K/UL — SIGNIFICANT CHANGE UP (ref 1.2–3.4)
LYMPHOCYTES # BLD AUTO: 13.7 % — LOW (ref 20.5–51.1)
MAGNESIUM SERPL-MCNC: 1.8 MG/DL — SIGNIFICANT CHANGE UP (ref 1.8–2.4)
MCHC RBC-ENTMCNC: 30.8 PG — SIGNIFICANT CHANGE UP (ref 27–31)
MCHC RBC-ENTMCNC: 34.7 G/DL — SIGNIFICANT CHANGE UP (ref 32–37)
MCV RBC AUTO: 88.9 FL — SIGNIFICANT CHANGE UP (ref 81–99)
MONOCYTES # BLD AUTO: 0.92 K/UL — HIGH (ref 0.1–0.6)
MONOCYTES NFR BLD AUTO: 7.7 % — SIGNIFICANT CHANGE UP (ref 1.7–9.3)
NEUTROPHILS # BLD AUTO: 9.09 K/UL — HIGH (ref 1.4–6.5)
NEUTROPHILS NFR BLD AUTO: 76.2 % — HIGH (ref 42.2–75.2)
NITRITE UR-MCNC: NEGATIVE — SIGNIFICANT CHANGE UP
NRBC BLD AUTO-RTO: 0 /100 WBCS — SIGNIFICANT CHANGE UP (ref 0–0)
PCO2 BLDV: 40 MMHG — SIGNIFICANT CHANGE UP (ref 39–42)
PCO2 BLDV: 48 MMHG — HIGH (ref 39–42)
PH BLDV: 7.38 — SIGNIFICANT CHANGE UP (ref 7.32–7.43)
PH BLDV: 7.4 — SIGNIFICANT CHANGE UP (ref 7.32–7.43)
PH UR: 7 — SIGNIFICANT CHANGE UP (ref 5–8)
PLATELET # BLD AUTO: 391 K/UL — SIGNIFICANT CHANGE UP (ref 130–400)
PMV BLD: 8.8 FL — SIGNIFICANT CHANGE UP (ref 7.4–10.4)
PO2 BLDV: 29 MMHG — SIGNIFICANT CHANGE UP (ref 25–45)
PO2 BLDV: 41 MMHG — SIGNIFICANT CHANGE UP (ref 25–45)
POTASSIUM BLDV-SCNC: 3.5 MMOL/L — SIGNIFICANT CHANGE UP (ref 3.5–5.1)
POTASSIUM BLDV-SCNC: 6.4 MMOL/L — CRITICAL HIGH (ref 3.5–5.1)
POTASSIUM SERPL-MCNC: 5.9 MMOL/L — HIGH (ref 3.5–5)
POTASSIUM SERPL-SCNC: 5.9 MMOL/L — HIGH (ref 3.5–5)
PROT SERPL-MCNC: 7.8 G/DL — SIGNIFICANT CHANGE UP (ref 6–8)
PROT UR-MCNC: NEGATIVE MG/DL — SIGNIFICANT CHANGE UP
RBC # BLD: 4.22 M/UL — SIGNIFICANT CHANGE UP (ref 4.2–5.4)
RBC # FLD: 12.7 % — SIGNIFICANT CHANGE UP (ref 11.5–14.5)
SAO2 % BLDV: 48.3 % — LOW (ref 67–88)
SAO2 % BLDV: 72.4 % — SIGNIFICANT CHANGE UP (ref 67–88)
SODIUM SERPL-SCNC: 128 MMOL/L — LOW (ref 135–146)
SP GR SPEC: 1.01 — SIGNIFICANT CHANGE UP (ref 1–1.03)
TROPONIN T, HIGH SENSITIVITY RESULT: 10 NG/L — SIGNIFICANT CHANGE UP (ref 6–13)
UROBILINOGEN FLD QL: 0.2 MG/DL — SIGNIFICANT CHANGE UP (ref 0.2–1)
WBC # BLD: 11.93 K/UL — HIGH (ref 4.8–10.8)
WBC # FLD AUTO: 11.93 K/UL — HIGH (ref 4.8–10.8)

## 2025-08-08 PROCEDURE — 99285 EMERGENCY DEPT VISIT HI MDM: CPT | Mod: FS

## 2025-08-08 PROCEDURE — 93010 ELECTROCARDIOGRAM REPORT: CPT

## 2025-08-08 PROCEDURE — 99223 1ST HOSP IP/OBS HIGH 75: CPT

## 2025-08-08 PROCEDURE — 84295 ASSAY OF SERUM SODIUM: CPT

## 2025-08-08 PROCEDURE — 70450 CT HEAD/BRAIN W/O DYE: CPT | Mod: 26

## 2025-08-08 PROCEDURE — 84100 ASSAY OF PHOSPHORUS: CPT

## 2025-08-08 PROCEDURE — 80053 COMPREHEN METABOLIC PANEL: CPT

## 2025-08-08 PROCEDURE — 93307 TTE W/O DOPPLER COMPLETE: CPT

## 2025-08-08 PROCEDURE — 71045 X-RAY EXAM CHEST 1 VIEW: CPT | Mod: 26

## 2025-08-08 PROCEDURE — 36415 COLL VENOUS BLD VENIPUNCTURE: CPT

## 2025-08-08 PROCEDURE — 84132 ASSAY OF SERUM POTASSIUM: CPT

## 2025-08-08 PROCEDURE — 83735 ASSAY OF MAGNESIUM: CPT

## 2025-08-08 PROCEDURE — 85025 COMPLETE CBC W/AUTO DIFF WBC: CPT

## 2025-08-08 PROCEDURE — 80048 BASIC METABOLIC PNL TOTAL CA: CPT

## 2025-08-08 PROCEDURE — 82803 BLOOD GASES ANY COMBINATION: CPT

## 2025-08-08 PROCEDURE — 83605 ASSAY OF LACTIC ACID: CPT

## 2025-08-08 PROCEDURE — 82330 ASSAY OF CALCIUM: CPT

## 2025-08-08 RX ORDER — SODIUM CHLORIDE 9 G/1000ML
1000 INJECTION, SOLUTION INTRAVENOUS ONCE
Refills: 0 | Status: COMPLETED | OUTPATIENT
Start: 2025-08-08 | End: 2025-08-08

## 2025-08-08 RX ORDER — SODIUM ZIRCONIUM CYCLOSILICATE 5 G/5G
10 POWDER, FOR SUSPENSION ORAL ONCE
Refills: 0 | Status: COMPLETED | OUTPATIENT
Start: 2025-08-08 | End: 2025-08-08

## 2025-08-08 RX ORDER — LIDOCAINE HYDROCHLORIDE 20 MG/ML
1 JELLY TOPICAL EVERY 24 HOURS
Refills: 0 | Status: DISCONTINUED | OUTPATIENT
Start: 2025-08-08 | End: 2025-08-09

## 2025-08-08 RX ORDER — DEXTROSE 50 % IN WATER 50 %
50 SYRINGE (ML) INTRAVENOUS ONCE
Refills: 0 | Status: COMPLETED | OUTPATIENT
Start: 2025-08-08 | End: 2025-08-08

## 2025-08-08 RX ORDER — ACETAMINOPHEN 500 MG/5ML
650 LIQUID (ML) ORAL EVERY 6 HOURS
Refills: 0 | Status: DISCONTINUED | OUTPATIENT
Start: 2025-08-08 | End: 2025-08-09

## 2025-08-08 RX ORDER — CALCIUM GLUCONATE 20 MG/ML
1 INJECTION, SOLUTION INTRAVENOUS ONCE
Refills: 0 | Status: COMPLETED | OUTPATIENT
Start: 2025-08-08 | End: 2025-08-08

## 2025-08-08 RX ADMIN — Medication 650 MILLIGRAM(S): at 23:01

## 2025-08-08 RX ADMIN — CALCIUM GLUCONATE 100 GRAM(S): 20 INJECTION, SOLUTION INTRAVENOUS at 17:45

## 2025-08-08 RX ADMIN — LIDOCAINE HYDROCHLORIDE 1 PATCH: 20 JELLY TOPICAL at 23:01

## 2025-08-08 RX ADMIN — Medication 650 MILLIGRAM(S): at 23:40

## 2025-08-08 RX ADMIN — Medication 50 MILLILITER(S): at 17:45

## 2025-08-08 RX ADMIN — SODIUM ZIRCONIUM CYCLOSILICATE 10 GRAM(S): 5 POWDER, FOR SUSPENSION ORAL at 17:44

## 2025-08-08 RX ADMIN — Medication 10 UNIT(S): at 18:25

## 2025-08-09 ENCOUNTER — RESULT REVIEW (OUTPATIENT)
Age: 89
End: 2025-08-09

## 2025-08-09 ENCOUNTER — TRANSCRIPTION ENCOUNTER (OUTPATIENT)
Age: 89
End: 2025-08-09

## 2025-08-09 VITALS
DIASTOLIC BLOOD PRESSURE: 80 MMHG | SYSTOLIC BLOOD PRESSURE: 163 MMHG | HEART RATE: 84 BPM | OXYGEN SATURATION: 95 % | TEMPERATURE: 99 F | RESPIRATION RATE: 18 BRPM

## 2025-08-09 LAB
ALBUMIN SERPL ELPH-MCNC: 4.2 G/DL — SIGNIFICANT CHANGE UP (ref 3.5–5.2)
ALP SERPL-CCNC: 103 U/L — SIGNIFICANT CHANGE UP (ref 30–115)
ALT FLD-CCNC: 20 U/L — SIGNIFICANT CHANGE UP (ref 0–41)
ANION GAP SERPL CALC-SCNC: 10 MMOL/L — SIGNIFICANT CHANGE UP (ref 7–14)
ANION GAP SERPL CALC-SCNC: 13 MMOL/L — SIGNIFICANT CHANGE UP (ref 7–14)
AST SERPL-CCNC: 26 U/L — SIGNIFICANT CHANGE UP (ref 0–41)
BASOPHILS # BLD AUTO: 0.07 K/UL — SIGNIFICANT CHANGE UP (ref 0–0.2)
BASOPHILS NFR BLD AUTO: 0.9 % — SIGNIFICANT CHANGE UP (ref 0–1)
BILIRUB SERPL-MCNC: 0.3 MG/DL — SIGNIFICANT CHANGE UP (ref 0.2–1.2)
BUN SERPL-MCNC: 10 MG/DL — SIGNIFICANT CHANGE UP (ref 10–20)
BUN SERPL-MCNC: 11 MG/DL — SIGNIFICANT CHANGE UP (ref 10–20)
CALCIUM SERPL-MCNC: 8.8 MG/DL — SIGNIFICANT CHANGE UP (ref 8.4–10.5)
CALCIUM SERPL-MCNC: 9.2 MG/DL — SIGNIFICANT CHANGE UP (ref 8.4–10.5)
CHLORIDE SERPL-SCNC: 96 MMOL/L — LOW (ref 98–110)
CHLORIDE SERPL-SCNC: 96 MMOL/L — LOW (ref 98–110)
CO2 SERPL-SCNC: 23 MMOL/L — SIGNIFICANT CHANGE UP (ref 17–32)
CO2 SERPL-SCNC: 26 MMOL/L — SIGNIFICANT CHANGE UP (ref 17–32)
CREAT SERPL-MCNC: 0.6 MG/DL — LOW (ref 0.7–1.5)
CREAT SERPL-MCNC: 0.6 MG/DL — LOW (ref 0.7–1.5)
EGFR: 86 ML/MIN/1.73M2 — SIGNIFICANT CHANGE UP
EOSINOPHIL # BLD AUTO: 0.25 K/UL — SIGNIFICANT CHANGE UP (ref 0–0.7)
EOSINOPHIL NFR BLD AUTO: 3.3 % — SIGNIFICANT CHANGE UP (ref 0–8)
GLUCOSE SERPL-MCNC: 119 MG/DL — HIGH (ref 70–99)
GLUCOSE SERPL-MCNC: 98 MG/DL — SIGNIFICANT CHANGE UP (ref 70–99)
HCT VFR BLD CALC: 34.9 % — LOW (ref 37–47)
HGB BLD-MCNC: 11.9 G/DL — LOW (ref 12–16)
IMM GRANULOCYTES NFR BLD AUTO: 0.3 % — SIGNIFICANT CHANGE UP (ref 0.1–0.3)
LYMPHOCYTES # BLD AUTO: 1.13 K/UL — LOW (ref 1.2–3.4)
LYMPHOCYTES # BLD AUTO: 14.9 % — LOW (ref 20.5–51.1)
MAGNESIUM SERPL-MCNC: 1.9 MG/DL — SIGNIFICANT CHANGE UP (ref 1.8–2.4)
MCHC RBC-ENTMCNC: 30.3 PG — SIGNIFICANT CHANGE UP (ref 27–31)
MCHC RBC-ENTMCNC: 34.1 G/DL — SIGNIFICANT CHANGE UP (ref 32–37)
MCV RBC AUTO: 88.8 FL — SIGNIFICANT CHANGE UP (ref 81–99)
MONOCYTES # BLD AUTO: 0.69 K/UL — HIGH (ref 0.1–0.6)
MONOCYTES NFR BLD AUTO: 9.1 % — SIGNIFICANT CHANGE UP (ref 1.7–9.3)
NEUTROPHILS # BLD AUTO: 5.44 K/UL — SIGNIFICANT CHANGE UP (ref 1.4–6.5)
NEUTROPHILS NFR BLD AUTO: 71.5 % — SIGNIFICANT CHANGE UP (ref 42.2–75.2)
NRBC BLD AUTO-RTO: 0 /100 WBCS — SIGNIFICANT CHANGE UP (ref 0–0)
PHOSPHATE SERPL-MCNC: 3.1 MG/DL — SIGNIFICANT CHANGE UP (ref 2.1–4.9)
PLATELET # BLD AUTO: 373 K/UL — SIGNIFICANT CHANGE UP (ref 130–400)
PMV BLD: 8.8 FL — SIGNIFICANT CHANGE UP (ref 7.4–10.4)
POTASSIUM SERPL-MCNC: 3.7 MMOL/L — SIGNIFICANT CHANGE UP (ref 3.5–5)
POTASSIUM SERPL-MCNC: 5.2 MMOL/L — HIGH (ref 3.5–5)
POTASSIUM SERPL-SCNC: 3.7 MMOL/L — SIGNIFICANT CHANGE UP (ref 3.5–5)
POTASSIUM SERPL-SCNC: 5.2 MMOL/L — HIGH (ref 3.5–5)
PROT SERPL-MCNC: 6.4 G/DL — SIGNIFICANT CHANGE UP (ref 6–8)
RBC # BLD: 3.93 M/UL — LOW (ref 4.2–5.4)
RBC # FLD: 12.7 % — SIGNIFICANT CHANGE UP (ref 11.5–14.5)
SODIUM SERPL-SCNC: 132 MMOL/L — LOW (ref 135–146)
SODIUM SERPL-SCNC: 132 MMOL/L — LOW (ref 135–146)
WBC # BLD: 7.6 K/UL — SIGNIFICANT CHANGE UP (ref 4.8–10.8)
WBC # FLD AUTO: 7.6 K/UL — SIGNIFICANT CHANGE UP (ref 4.8–10.8)

## 2025-08-09 PROCEDURE — 99239 HOSP IP/OBS DSCHRG MGMT >30: CPT

## 2025-08-09 PROCEDURE — 99222 1ST HOSP IP/OBS MODERATE 55: CPT

## 2025-08-09 PROCEDURE — 93306 TTE W/DOPPLER COMPLETE: CPT | Mod: 26

## 2025-08-09 RX ORDER — MAGNESIUM, ALUMINUM HYDROXIDE 200-200 MG
30 TABLET,CHEWABLE ORAL EVERY 4 HOURS
Refills: 0 | Status: DISCONTINUED | OUTPATIENT
Start: 2025-08-09 | End: 2025-08-09

## 2025-08-09 RX ORDER — LISINOPRIL 5 MG/1
5 TABLET ORAL DAILY
Refills: 0 | Status: DISCONTINUED | OUTPATIENT
Start: 2025-08-09 | End: 2025-08-09

## 2025-08-09 RX ORDER — SODIUM ZIRCONIUM CYCLOSILICATE 5 G/5G
5 POWDER, FOR SUSPENSION ORAL ONCE
Refills: 0 | Status: COMPLETED | OUTPATIENT
Start: 2025-08-09 | End: 2025-08-09

## 2025-08-09 RX ORDER — ONDANSETRON HCL/PF 4 MG/2 ML
4 VIAL (ML) INJECTION EVERY 8 HOURS
Refills: 0 | Status: DISCONTINUED | OUTPATIENT
Start: 2025-08-09 | End: 2025-08-09

## 2025-08-09 RX ORDER — LISINOPRIL 5 MG/1
1 TABLET ORAL
Refills: 0 | DISCHARGE

## 2025-08-09 RX ORDER — LIDOCAINE HYDROCHLORIDE 20 MG/ML
1 JELLY TOPICAL EVERY 24 HOURS
Refills: 0 | Status: DISCONTINUED | OUTPATIENT
Start: 2025-08-09 | End: 2025-08-09

## 2025-08-09 RX ORDER — MELATONIN 5 MG
3 TABLET ORAL AT BEDTIME
Refills: 0 | Status: DISCONTINUED | OUTPATIENT
Start: 2025-08-09 | End: 2025-08-09

## 2025-08-09 RX ADMIN — LISINOPRIL 5 MILLIGRAM(S): 5 TABLET ORAL at 08:57

## 2025-08-09 RX ADMIN — SODIUM ZIRCONIUM CYCLOSILICATE 5 GRAM(S): 5 POWDER, FOR SUSPENSION ORAL at 10:51

## 2025-08-09 RX ADMIN — LIDOCAINE HYDROCHLORIDE 1 PATCH: 20 JELLY TOPICAL at 07:11

## 2025-08-09 RX ADMIN — Medication 500 MILLILITER(S): at 10:51

## 2025-08-19 DIAGNOSIS — I63.89 OTHER CEREBRAL INFARCTION: ICD-10-CM

## 2025-08-19 DIAGNOSIS — Z88.1 ALLERGY STATUS TO OTHER ANTIBIOTIC AGENTS: ICD-10-CM

## 2025-08-19 DIAGNOSIS — E87.5 HYPERKALEMIA: ICD-10-CM

## 2025-08-19 DIAGNOSIS — I10 ESSENTIAL (PRIMARY) HYPERTENSION: ICD-10-CM

## 2025-08-19 DIAGNOSIS — E87.1 HYPO-OSMOLALITY AND HYPONATREMIA: ICD-10-CM

## 2025-08-19 DIAGNOSIS — I95.1 ORTHOSTATIC HYPOTENSION: ICD-10-CM

## 2025-08-19 DIAGNOSIS — Z99.81 DEPENDENCE ON SUPPLEMENTAL OXYGEN: ICD-10-CM

## 2025-08-19 DIAGNOSIS — G89.29 OTHER CHRONIC PAIN: ICD-10-CM

## 2025-08-19 DIAGNOSIS — J44.9 CHRONIC OBSTRUCTIVE PULMONARY DISEASE, UNSPECIFIED: ICD-10-CM

## 2025-08-19 DIAGNOSIS — E86.0 DEHYDRATION: ICD-10-CM

## 2025-08-20 ENCOUNTER — OUTPATIENT (OUTPATIENT)
Dept: OUTPATIENT SERVICES | Facility: HOSPITAL | Age: 89
LOS: 1 days | End: 2025-08-20
Payer: MEDICARE

## 2025-08-20 DIAGNOSIS — M54.59 OTHER LOW BACK PAIN: ICD-10-CM

## 2025-08-20 DIAGNOSIS — Z00.8 ENCOUNTER FOR OTHER GENERAL EXAMINATION: ICD-10-CM

## 2025-08-20 PROCEDURE — 72148 MRI LUMBAR SPINE W/O DYE: CPT | Mod: 26

## 2025-08-20 PROCEDURE — 72148 MRI LUMBAR SPINE W/O DYE: CPT

## 2025-08-21 DIAGNOSIS — M54.59 OTHER LOW BACK PAIN: ICD-10-CM

## 2025-09-06 ENCOUNTER — OUTPATIENT (OUTPATIENT)
Dept: OUTPATIENT SERVICES | Facility: HOSPITAL | Age: 89
LOS: 1 days | End: 2025-09-06
Payer: MEDICARE

## 2025-09-06 DIAGNOSIS — I63.81 OTHER CEREBRAL INFARCTION DUE TO OCCLUSION OR STENOSIS OF SMALL ARTERY: ICD-10-CM

## 2025-09-06 DIAGNOSIS — Z00.8 ENCOUNTER FOR OTHER GENERAL EXAMINATION: ICD-10-CM

## 2025-09-06 PROCEDURE — 70551 MRI BRAIN STEM W/O DYE: CPT

## 2025-09-06 PROCEDURE — 70551 MRI BRAIN STEM W/O DYE: CPT | Mod: 26

## 2025-09-07 DIAGNOSIS — I63.81 OTHER CEREBRAL INFARCTION DUE TO OCCLUSION OR STENOSIS OF SMALL ARTERY: ICD-10-CM
